# Patient Record
Sex: FEMALE | Race: WHITE | Employment: OTHER | URBAN - METROPOLITAN AREA
[De-identification: names, ages, dates, MRNs, and addresses within clinical notes are randomized per-mention and may not be internally consistent; named-entity substitution may affect disease eponyms.]

---

## 2023-05-23 ENCOUNTER — HOSPITAL ENCOUNTER (OUTPATIENT)
Dept: DATA CONVERSION | Facility: HOSPITAL | Age: 78
End: 2023-05-23
Attending: INTERNAL MEDICINE | Admitting: INTERNAL MEDICINE
Payer: MEDICARE

## 2023-05-23 DIAGNOSIS — J44.9 CHRONIC OBSTRUCTIVE PULMONARY DISEASE, UNSPECIFIED (MULTI): ICD-10-CM

## 2023-05-23 DIAGNOSIS — F17.200 NICOTINE DEPENDENCE, UNSPECIFIED, UNCOMPLICATED: ICD-10-CM

## 2023-05-23 DIAGNOSIS — I20.89 OTHER FORMS OF ANGINA PECTORIS (CMS-HCC): ICD-10-CM

## 2023-05-23 DIAGNOSIS — E78.5 HYPERLIPIDEMIA, UNSPECIFIED: ICD-10-CM

## 2023-05-23 DIAGNOSIS — I25.118 ATHEROSCLEROTIC HEART DISEASE OF NATIVE CORONARY ARTERY WITH OTHER FORMS OF ANGINA PECTORIS (CMS-HCC): ICD-10-CM

## 2023-05-23 DIAGNOSIS — I73.9 PERIPHERAL VASCULAR DISEASE, UNSPECIFIED (CMS-HCC): ICD-10-CM

## 2023-05-23 DIAGNOSIS — R94.30 ABNORMAL RESULT OF CARDIOVASCULAR FUNCTION STUDY, UNSPECIFIED: ICD-10-CM

## 2023-05-23 DIAGNOSIS — E11.9 TYPE 2 DIABETES MELLITUS WITHOUT COMPLICATIONS (MULTI): ICD-10-CM

## 2023-05-23 DIAGNOSIS — R07.9 CHEST PAIN, UNSPECIFIED: ICD-10-CM

## 2023-05-23 DIAGNOSIS — I25.82 CHRONIC TOTAL OCCLUSION OF CORONARY ARTERY: ICD-10-CM

## 2023-05-23 DIAGNOSIS — I10 ESSENTIAL (PRIMARY) HYPERTENSION: ICD-10-CM

## 2023-05-23 LAB
ANION GAP IN SER/PLAS: 8 MMOL/L (ref 10–20)
CALCIUM (MG/DL) IN SER/PLAS: 9.3 MG/DL (ref 8.6–10.3)
CARBON DIOXIDE, TOTAL (MMOL/L) IN SER/PLAS: 34 MMOL/L (ref 21–32)
CHLORIDE (MMOL/L) IN SER/PLAS: 95 MMOL/L (ref 98–107)
CREATININE (MG/DL) IN SER/PLAS: 0.79 MG/DL (ref 0.5–1.05)
ERYTHROCYTE DISTRIBUTION WIDTH (RATIO) BY AUTOMATED COUNT: 13.3 % (ref 11.5–14.5)
ERYTHROCYTE MEAN CORPUSCULAR HEMOGLOBIN CONCENTRATION (G/DL) BY AUTOMATED: 32.8 G/DL (ref 32–36)
ERYTHROCYTE MEAN CORPUSCULAR VOLUME (FL) BY AUTOMATED COUNT: 92 FL (ref 80–100)
ERYTHROCYTES (10*6/UL) IN BLOOD BY AUTOMATED COUNT: 4.67 X10E12/L (ref 4–5.2)
GFR FEMALE: 78 ML/MIN/1.73M2
GLUCOSE (MG/DL) IN SER/PLAS: 164 MG/DL (ref 74–99)
HEMATOCRIT (%) IN BLOOD BY AUTOMATED COUNT: 43 % (ref 36–46)
HEMOGLOBIN (G/DL) IN BLOOD: 14.1 G/DL (ref 12–16)
LEUKOCYTES (10*3/UL) IN BLOOD BY AUTOMATED COUNT: 5.7 X10E9/L (ref 4.4–11.3)
PLATELETS (10*3/UL) IN BLOOD AUTOMATED COUNT: 385 X10E9/L (ref 150–450)
POCT GLUCOSE: 157 MG/DL (ref 74–99)
POCT GLUCOSE: 159 MG/DL (ref 74–99)
POCT GLUCOSE: 183 MG/DL (ref 74–99)
POTASSIUM (MMOL/L) IN SER/PLAS: 3.6 MMOL/L (ref 3.5–5.3)
SODIUM (MMOL/L) IN SER/PLAS: 133 MMOL/L (ref 136–145)
UREA NITROGEN (MG/DL) IN SER/PLAS: 16 MG/DL (ref 6–23)

## 2023-07-10 LAB
ALANINE AMINOTRANSFERASE (SGPT) (U/L) IN SER/PLAS: 20 U/L (ref 7–45)
ASPARTATE AMINOTRANSFERASE (SGOT) (U/L) IN SER/PLAS: 23 U/L (ref 9–39)
CHOLESTEROL (MG/DL) IN SER/PLAS: 200 MG/DL (ref 0–199)
CHOLESTEROL IN HDL (MG/DL) IN SER/PLAS: 71.2 MG/DL
CHOLESTEROL/HDL RATIO: 2.8
LDL: 104 MG/DL (ref 0–99)
TRIGLYCERIDE (MG/DL) IN SER/PLAS: 125 MG/DL (ref 0–149)
VLDL: 25 MG/DL (ref 0–40)

## 2023-09-02 PROBLEM — I10 BENIGN ESSENTIAL HYPERTENSION: Status: ACTIVE | Noted: 2023-09-02

## 2023-09-02 PROBLEM — G47.00 INSOMNIA: Status: ACTIVE | Noted: 2023-09-02

## 2023-09-02 PROBLEM — M79.671 FOOT PAIN, BILATERAL: Status: ACTIVE | Noted: 2023-09-02

## 2023-09-02 PROBLEM — R82.81 PYURIA: Status: ACTIVE | Noted: 2023-09-02

## 2023-09-02 PROBLEM — M25.561 BILATERAL KNEE PAIN: Status: ACTIVE | Noted: 2023-09-02

## 2023-09-02 PROBLEM — R04.0 EPISTAXIS: Status: ACTIVE | Noted: 2023-09-02

## 2023-09-02 PROBLEM — K22.4 ESOPHAGEAL SPASM: Status: ACTIVE | Noted: 2023-09-02

## 2023-09-02 PROBLEM — M47.816 LUMBAR FACET ARTHROPATHY: Status: ACTIVE | Noted: 2023-09-02

## 2023-09-02 PROBLEM — R31.9 HEMATURIA: Status: ACTIVE | Noted: 2023-09-02

## 2023-09-02 PROBLEM — R26.2 DIFFICULTY WALKING: Status: ACTIVE | Noted: 2023-09-02

## 2023-09-02 PROBLEM — E78.5 DYSLIPIDEMIA: Status: ACTIVE | Noted: 2023-09-02

## 2023-09-02 PROBLEM — M62.81 MUSCLE WEAKNESS (GENERALIZED): Status: ACTIVE | Noted: 2023-09-02

## 2023-09-02 PROBLEM — M25.562 BILATERAL KNEE PAIN: Status: ACTIVE | Noted: 2023-09-02

## 2023-09-02 PROBLEM — M25.671 STIFFNESS OF RIGHT ANKLE JOINT: Status: ACTIVE | Noted: 2023-09-02

## 2023-09-02 PROBLEM — R79.89 ELEVATED LFTS: Status: ACTIVE | Noted: 2023-09-02

## 2023-09-02 PROBLEM — H01.019 BLEPHARITIS, ULCERATIVE: Status: ACTIVE | Noted: 2023-09-02

## 2023-09-02 PROBLEM — L84 CALLUS: Status: ACTIVE | Noted: 2023-09-02

## 2023-09-02 PROBLEM — M51.26 PROTRUSION OF LUMBAR INTERVERTEBRAL DISC: Status: ACTIVE | Noted: 2023-09-02

## 2023-09-02 PROBLEM — I10 HYPERTENSION: Status: ACTIVE | Noted: 2023-09-02

## 2023-09-02 PROBLEM — R10.9 ABDOMINAL PAIN: Status: ACTIVE | Noted: 2023-09-02

## 2023-09-02 PROBLEM — E11.3293: Status: ACTIVE | Noted: 2023-09-02

## 2023-09-02 PROBLEM — R20.2 PARESTHESIA OF BOTH HANDS: Status: ACTIVE | Noted: 2023-09-02

## 2023-09-02 PROBLEM — G47.33 OBSTRUCTIVE SLEEP APNEA SYNDROME: Status: ACTIVE | Noted: 2023-09-02

## 2023-09-02 PROBLEM — J38.00 VOCAL CORD PARALYSIS: Status: ACTIVE | Noted: 2023-09-02

## 2023-09-02 PROBLEM — K14.6 BURNING MOUTH SYNDROME: Status: ACTIVE | Noted: 2023-09-02

## 2023-09-02 PROBLEM — H61.23 BILATERAL IMPACTED CERUMEN: Status: ACTIVE | Noted: 2023-09-02

## 2023-09-02 PROBLEM — L84 PRE-ULCERATIVE CORN OR CALLOUS: Status: ACTIVE | Noted: 2023-09-02

## 2023-09-02 PROBLEM — E11.65 TYPE 2 DIABETES MELLITUS WITH HYPERGLYCEMIA (MULTI): Status: ACTIVE | Noted: 2023-09-02

## 2023-09-02 PROBLEM — S83.511A RUPTURE OF ANTERIOR CRUCIATE LIGAMENT OF RIGHT KNEE: Status: ACTIVE | Noted: 2023-09-02

## 2023-09-02 PROBLEM — Z96.1 PSEUDOPHAKIA, BOTH EYES: Status: ACTIVE | Noted: 2023-09-02

## 2023-09-02 PROBLEM — J34.2 DEVIATED NASAL SEPTUM: Status: ACTIVE | Noted: 2023-09-02

## 2023-09-02 PROBLEM — R10.31 RIGHT LOWER QUADRANT ABDOMINAL PAIN: Status: ACTIVE | Noted: 2023-09-02

## 2023-09-02 PROBLEM — H40.053 BILATERAL OCULAR HYPERTENSION: Status: ACTIVE | Noted: 2023-09-02

## 2023-09-02 PROBLEM — J31.0 CHRONIC RHINITIS: Status: ACTIVE | Noted: 2023-09-02

## 2023-09-02 PROBLEM — R10.11 RUQ DISCOMFORT: Status: ACTIVE | Noted: 2023-09-02

## 2023-09-02 PROBLEM — M79.7 FIBROMYALGIA: Status: ACTIVE | Noted: 2023-09-02

## 2023-09-02 PROBLEM — H26.492 PCO (POSTERIOR CAPSULAR OPACIFICATION), LEFT: Status: ACTIVE | Noted: 2023-09-02

## 2023-09-02 PROBLEM — M79.672 FOOT PAIN, BILATERAL: Status: ACTIVE | Noted: 2023-09-02

## 2023-09-02 PROBLEM — E78.00 ELEVATED LDL CHOLESTEROL LEVEL: Status: ACTIVE | Noted: 2023-09-02

## 2023-09-02 PROBLEM — H02.403 ACQUIRED INVOLUTIONAL PTOSIS OF BOTH EYELIDS: Status: ACTIVE | Noted: 2023-09-02

## 2023-09-02 PROBLEM — M25.561 RIGHT KNEE PAIN: Status: ACTIVE | Noted: 2023-09-02

## 2023-09-02 PROBLEM — K13.79 MOUTH SORES: Status: ACTIVE | Noted: 2023-09-02

## 2023-09-02 PROBLEM — R10.13 EPIGASTRIC PAIN: Status: ACTIVE | Noted: 2023-09-02

## 2023-09-02 PROBLEM — M47.816 LUMBAR SPONDYLOSIS: Status: ACTIVE | Noted: 2023-09-02

## 2023-09-02 PROBLEM — M54.50 LOW BACK PAIN: Status: ACTIVE | Noted: 2023-09-02

## 2023-09-02 PROBLEM — R31.21 ASYMPTOMATIC MICROSCOPIC HEMATURIA: Status: ACTIVE | Noted: 2023-09-02

## 2023-09-02 PROBLEM — K21.00 GERD WITH ESOPHAGITIS: Status: ACTIVE | Noted: 2023-09-02

## 2023-09-02 PROBLEM — M48.061 LUMBAR SPINAL STENOSIS: Status: ACTIVE | Noted: 2023-09-02

## 2023-09-02 PROBLEM — J44.9 CHRONIC OBSTRUCTIVE PULMONARY DISEASE (MULTI): Status: ACTIVE | Noted: 2023-09-02

## 2023-09-02 PROBLEM — J34.3 HYPERTROPHY OF BOTH INFERIOR NASAL TURBINATES: Status: ACTIVE | Noted: 2023-09-02

## 2023-09-02 PROBLEM — J34.89 RHINORRHEA: Status: ACTIVE | Noted: 2023-09-02

## 2023-09-02 PROBLEM — H40.003 GLAUCOMA SUSPECT, BOTH EYES: Status: ACTIVE | Noted: 2023-09-02

## 2023-09-02 RX ORDER — AMLODIPINE BESYLATE 5 MG/1
5 TABLET ORAL DAILY
COMMUNITY
End: 2023-11-27 | Stop reason: SDUPTHER

## 2023-09-02 RX ORDER — INSULIN GLARGINE 100 [IU]/ML
27 INJECTION, SOLUTION SUBCUTANEOUS EVERY MORNING
Status: ON HOLD | COMMUNITY
Start: 2021-03-16 | End: 2024-02-17 | Stop reason: WASHOUT

## 2023-09-02 RX ORDER — FLUTICASONE PROPIONATE 50 MCG
1 SPRAY, SUSPENSION (ML) NASAL 2 TIMES DAILY
COMMUNITY
Start: 2018-08-06 | End: 2023-11-27 | Stop reason: ALTCHOICE

## 2023-09-02 RX ORDER — PEN NEEDLE, DIABETIC 32GX 5/32"
NEEDLE, DISPOSABLE MISCELLANEOUS
COMMUNITY
Start: 2022-10-28

## 2023-09-02 RX ORDER — BUDESONIDE, GLYCOPYRROLATE, AND FORMOTEROL FUMARATE 160; 9; 4.8 UG/1; UG/1; UG/1
2 AEROSOL, METERED RESPIRATORY (INHALATION)
COMMUNITY
Start: 2023-05-02 | End: 2024-02-17 | Stop reason: HOSPADM

## 2023-09-02 RX ORDER — GABAPENTIN 100 MG/1
100 CAPSULE ORAL 3 TIMES DAILY
COMMUNITY
End: 2023-11-27 | Stop reason: ALTCHOICE

## 2023-09-02 RX ORDER — METOPROLOL SUCCINATE 50 MG/1
25 TABLET, EXTENDED RELEASE ORAL DAILY
COMMUNITY
Start: 2023-03-03 | End: 2023-11-27 | Stop reason: SDUPTHER

## 2023-09-02 RX ORDER — INSULIN DEGLUDEC 100 U/ML
30 INJECTION, SOLUTION SUBCUTANEOUS
COMMUNITY
Start: 2022-10-28 | End: 2024-01-31 | Stop reason: SDUPTHER

## 2023-09-02 RX ORDER — OMEPRAZOLE 20 MG/1
CAPSULE, DELAYED RELEASE ORAL
COMMUNITY
End: 2023-11-27 | Stop reason: ALTCHOICE

## 2023-09-02 RX ORDER — GABAPENTIN 300 MG/1
300 CAPSULE ORAL DAILY
COMMUNITY
End: 2024-02-17 | Stop reason: HOSPADM

## 2023-09-02 RX ORDER — PREDNISOLONE 15 MG/5ML
10 SOLUTION ORAL 3 TIMES DAILY
COMMUNITY
Start: 2021-09-23 | End: 2023-11-27 | Stop reason: ALTCHOICE

## 2023-09-02 RX ORDER — ATORVASTATIN CALCIUM 80 MG/1
80 TABLET, FILM COATED ORAL DAILY
COMMUNITY
End: 2023-11-27 | Stop reason: ALTCHOICE

## 2023-09-02 RX ORDER — DIPHENHYDRAMINE HCL 12.5MG/5ML
10 ELIXIR ORAL DAILY
COMMUNITY
End: 2023-11-27 | Stop reason: ALTCHOICE

## 2023-09-02 RX ORDER — AZELASTINE 1 MG/ML
2 SPRAY, METERED NASAL 2 TIMES DAILY
COMMUNITY
Start: 2018-08-06 | End: 2023-11-27 | Stop reason: ALTCHOICE

## 2023-09-02 RX ORDER — NYSTATIN 100000 [USP'U]/ML
10 SUSPENSION ORAL DAILY
Status: ON HOLD | COMMUNITY
Start: 2021-09-23 | End: 2024-02-17 | Stop reason: WASHOUT

## 2023-09-02 RX ORDER — MELOXICAM 15 MG/1
15 TABLET ORAL DAILY
COMMUNITY
End: 2023-11-27 | Stop reason: ALTCHOICE

## 2023-09-02 RX ORDER — INSULIN LISPRO 100 [IU]/ML
60 INJECTION, SOLUTION INTRAVENOUS; SUBCUTANEOUS DAILY
Status: ON HOLD | COMMUNITY
Start: 2018-12-04 | End: 2024-02-17 | Stop reason: WASHOUT

## 2023-09-02 RX ORDER — ALPRAZOLAM 0.25 MG/1
0.25 TABLET ORAL 2 TIMES DAILY
Status: ON HOLD | COMMUNITY
End: 2024-02-17 | Stop reason: WASHOUT

## 2023-09-02 RX ORDER — ASPIRIN 81 MG/1
81 TABLET ORAL DAILY
COMMUNITY
End: 2023-11-27 | Stop reason: ALTCHOICE

## 2023-09-02 RX ORDER — PIOGLITAZONEHYDROCHLORIDE 30 MG/1
30 TABLET ORAL DAILY
COMMUNITY
End: 2023-11-27 | Stop reason: ALTCHOICE

## 2023-09-02 RX ORDER — OMEPRAZOLE 40 MG/1
CAPSULE, DELAYED RELEASE ORAL DAILY
COMMUNITY
End: 2023-11-27 | Stop reason: ALTCHOICE

## 2023-09-02 RX ORDER — ISOSORBIDE MONONITRATE 60 MG/1
60 TABLET, EXTENDED RELEASE ORAL DAILY
COMMUNITY
End: 2023-11-27 | Stop reason: ALTCHOICE

## 2023-09-02 RX ORDER — GABAPENTIN 400 MG/1
CAPSULE ORAL
COMMUNITY
Start: 2021-03-16 | End: 2023-11-27 | Stop reason: ALTCHOICE

## 2023-09-02 RX ORDER — CLOBETASOL PROPIONATE 0.05 MG/G
GEL TOPICAL 2 TIMES DAILY
COMMUNITY
Start: 2021-11-17 | End: 2023-11-27 | Stop reason: ALTCHOICE

## 2023-09-02 RX ORDER — ASPIRIN 325 MG
TABLET, DELAYED RELEASE (ENTERIC COATED) ORAL
COMMUNITY
End: 2023-11-27 | Stop reason: ALTCHOICE

## 2023-09-02 RX ORDER — AZELASTINE HCL 205.5 UG/1
1 SPRAY NASAL 2 TIMES DAILY
COMMUNITY
End: 2023-11-27 | Stop reason: ALTCHOICE

## 2023-09-02 RX ORDER — CIPROFLOXACIN 500 MG/1
TABLET ORAL
COMMUNITY
Start: 2022-01-11 | End: 2023-11-27 | Stop reason: ALTCHOICE

## 2023-09-02 RX ORDER — LATANOPROST 50 UG/ML
1 SOLUTION/ DROPS OPHTHALMIC DAILY
COMMUNITY
Start: 2020-07-31 | End: 2024-01-04 | Stop reason: SDUPTHER

## 2023-09-02 RX ORDER — LOSARTAN POTASSIUM AND HYDROCHLOROTHIAZIDE 25; 100 MG/1; MG/1
1 TABLET ORAL DAILY
COMMUNITY
Start: 2018-08-31 | End: 2023-11-27 | Stop reason: ALTCHOICE

## 2023-09-02 RX ORDER — ATORVASTATIN CALCIUM 40 MG/1
40 TABLET, FILM COATED ORAL DAILY
COMMUNITY
End: 2024-02-17 | Stop reason: HOSPADM

## 2023-09-02 RX ORDER — VALSARTAN AND HYDROCHLOROTHIAZIDE 160; 25 MG/1; MG/1
1 TABLET ORAL DAILY
COMMUNITY
End: 2023-11-27 | Stop reason: ALTCHOICE

## 2023-09-02 RX ORDER — METFORMIN HYDROCHLORIDE 500 MG/1
500 TABLET ORAL
COMMUNITY
Start: 2021-09-03 | End: 2024-02-17 | Stop reason: HOSPADM

## 2023-09-02 RX ORDER — CLOTRIMAZOLE 10 MG/1
10 LOZENGE ORAL; TOPICAL 2 TIMES DAILY
COMMUNITY
Start: 2021-10-20 | End: 2023-11-27 | Stop reason: ALTCHOICE

## 2023-09-07 VITALS — BODY MASS INDEX: 24.77 KG/M2 | HEIGHT: 63 IN | WEIGHT: 139.77 LBS

## 2023-09-11 LAB
ALANINE AMINOTRANSFERASE (SGPT) (U/L) IN SER/PLAS: 21 U/L (ref 7–45)
ASPARTATE AMINOTRANSFERASE (SGOT) (U/L) IN SER/PLAS: 22 U/L (ref 9–39)
CHOLESTEROL (MG/DL) IN SER/PLAS: 136 MG/DL (ref 0–199)
CHOLESTEROL IN HDL (MG/DL) IN SER/PLAS: 55 MG/DL
CHOLESTEROL/HDL RATIO: 2.5
LDL: 60 MG/DL (ref 0–99)
TRIGLYCERIDE (MG/DL) IN SER/PLAS: 104 MG/DL (ref 0–149)
VLDL: 21 MG/DL (ref 0–40)

## 2023-09-30 NOTE — H&P
History & Physical Reviewed:   I have reviewed the History and Physical dated:  15-May-2023   History and Physical reviewed and relevant findings noted. Patient examined to review pertinent physical  findings.: No significant changes   Home Medications Reviewed: no changes noted   Allergies Reviewed: no changes noted       Airway/Sedation Assessment:  ·  Emotional Status calm   ·  Neurologic alert & oriented x 3   ·  Respiratory clear to auscultation   ·  Cardiovascular rhythm & rate regular   ·  GI/ soft, nontender     · Pulses present: Pedal Left, Pedal Right, Radial Left, Radial Right     ·  Mouth Opening OK yes   ·  Neck Flexibility OK yes   ·  Loose Teeth no   ·  Oropharyngeal Classification Class III   ·  ASA PS Classification ASA III   ·  Sedation Plan moderate sedation       ERAS (Enhanced Recovery After Surgery):  ·  ERAS Patient: no     Consent:   COVID-19 Consent:  ·  COVID-19 Risk Consent Surgeon has reviewed key risks related to the risk of nba COVID-19 and if they contract COVID-19 what the risks are.     Assessment/Plan:   ·  Assessment and Plan    77 Year old female with Cp presents for Left heart Cath with Dr Lai.      Electronic Signatures:  Ese Torres (PAC)  (Signed 23-May-2023 08:17)   Authored: History & Physical Reviewed, Airway/Sedation,  ERAS, Consent, Assessment/Plan, Note Completion      Last Updated: 23-May-2023 08:17 by Ese Torres (PAC)

## 2023-10-04 ENCOUNTER — PHARMACY VISIT (OUTPATIENT)
Dept: PHARMACY | Facility: CLINIC | Age: 78
End: 2023-10-04
Payer: MEDICARE

## 2023-10-04 PROCEDURE — RXMED WILLOW AMBULATORY MEDICATION CHARGE

## 2023-10-05 ENCOUNTER — PHARMACY VISIT (OUTPATIENT)
Dept: PHARMACY | Facility: CLINIC | Age: 78
End: 2023-10-05
Payer: MEDICARE

## 2023-11-27 ENCOUNTER — OFFICE VISIT (OUTPATIENT)
Dept: CARDIOLOGY | Facility: CLINIC | Age: 78
End: 2023-11-27
Payer: MEDICARE

## 2023-11-27 VITALS
DIASTOLIC BLOOD PRESSURE: 88 MMHG | OXYGEN SATURATION: 98 % | HEART RATE: 93 BPM | BODY MASS INDEX: 24.15 KG/M2 | WEIGHT: 140.7 LBS | SYSTOLIC BLOOD PRESSURE: 156 MMHG

## 2023-11-27 DIAGNOSIS — E78.5 DYSLIPIDEMIA: ICD-10-CM

## 2023-11-27 DIAGNOSIS — I63.9 CEREBROVASCULAR ACCIDENT (CVA), UNSPECIFIED MECHANISM (MULTI): ICD-10-CM

## 2023-11-27 DIAGNOSIS — M19.90 ARTHRITIS: ICD-10-CM

## 2023-11-27 DIAGNOSIS — I25.10 CORONARY ARTERY DISEASE INVOLVING NATIVE CORONARY ARTERY OF NATIVE HEART WITHOUT ANGINA PECTORIS: ICD-10-CM

## 2023-11-27 DIAGNOSIS — I10 BENIGN ESSENTIAL HYPERTENSION: Primary | ICD-10-CM

## 2023-11-27 PROCEDURE — 99215 OFFICE O/P EST HI 40 MIN: CPT | Performed by: NURSE PRACTITIONER

## 2023-11-27 PROCEDURE — 93010 ELECTROCARDIOGRAM REPORT: CPT | Performed by: INTERNAL MEDICINE

## 2023-11-27 PROCEDURE — 1159F MED LIST DOCD IN RCRD: CPT | Performed by: NURSE PRACTITIONER

## 2023-11-27 PROCEDURE — 1126F AMNT PAIN NOTED NONE PRSNT: CPT | Performed by: NURSE PRACTITIONER

## 2023-11-27 PROCEDURE — 3079F DIAST BP 80-89 MM HG: CPT | Performed by: NURSE PRACTITIONER

## 2023-11-27 PROCEDURE — 93005 ELECTROCARDIOGRAM TRACING: CPT | Performed by: NURSE PRACTITIONER

## 2023-11-27 PROCEDURE — 3077F SYST BP >= 140 MM HG: CPT | Performed by: NURSE PRACTITIONER

## 2023-11-27 RX ORDER — MELOXICAM 15 MG/1
15 TABLET ORAL 3 TIMES WEEKLY
Start: 2023-11-27 | End: 2024-02-17 | Stop reason: HOSPADM

## 2023-11-27 RX ORDER — HYDRALAZINE HYDROCHLORIDE 25 MG/1
25 TABLET, FILM COATED ORAL 2 TIMES DAILY
Qty: 60 TABLET | Refills: 11
Start: 2023-11-27 | End: 2024-01-15 | Stop reason: ALTCHOICE

## 2023-11-27 RX ORDER — METOPROLOL SUCCINATE 25 MG/1
25 TABLET, EXTENDED RELEASE ORAL DAILY
Qty: 90 TABLET | Refills: 3 | Status: SHIPPED | OUTPATIENT
Start: 2023-11-27 | End: 2024-02-17 | Stop reason: HOSPADM

## 2023-11-27 RX ORDER — AMLODIPINE BESYLATE 10 MG/1
10 TABLET ORAL DAILY
Qty: 90 TABLET | Refills: 3 | Status: SHIPPED | OUTPATIENT
Start: 2023-11-27 | End: 2024-02-17 | Stop reason: HOSPADM

## 2023-11-27 RX ORDER — EZETIMIBE 10 MG/1
10 TABLET ORAL DAILY
COMMUNITY
End: 2024-02-17 | Stop reason: HOSPADM

## 2023-11-27 RX ORDER — LOSARTAN POTASSIUM 100 MG/1
100 TABLET ORAL DAILY
Qty: 30 TABLET | Refills: 11
Start: 2023-11-27 | End: 2024-02-17 | Stop reason: HOSPADM

## 2023-11-27 ASSESSMENT — LIFESTYLE VARIABLES
HOW OFTEN DO YOU HAVE A DRINK CONTAINING ALCOHOL: NEVER
SKIP TO QUESTIONS 9-10: 1
HOW MANY STANDARD DRINKS CONTAINING ALCOHOL DO YOU HAVE ON A TYPICAL DAY: PATIENT DOES NOT DRINK
HOW OFTEN DO YOU HAVE SIX OR MORE DRINKS ON ONE OCCASION: NEVER
AUDIT-C TOTAL SCORE: 0

## 2023-11-27 ASSESSMENT — PAIN SCALES - GENERAL: PAINLEVEL: 0-NO PAIN

## 2023-11-27 NOTE — PROGRESS NOTES
Subjective   Malou Junior is a 78 y.o. female.    Chief Complaint:  Hypertension, Hyperlipidemia, and Hospital Follow-up    Mrs. Junior returns for a follow up. She is seen in collaboration with Dr. Garcia. She was hospitalized 9/2023 for right sided weakness. MRI of brain showed acute infarct. Her carotid ultrasounds showed <50% stenosis bilaterally and her echocardiogram showed an EF of 60-65% with no significant valvular disease. Post discharge she wore a holter monitor for 2 weeks which showed no evidence of atrial fibrillation, though she was started on eliqius while in the hospital. It is unclear if there is any documentation of a-fib or if it was presumed. A few of her BP medications were changed which she seems to be tolerating. She continues to work with PT/OT and feels her strength is improving. She offers no other complaints or concerns today. She denies any complaints of chest pain, shortness of breath, lightheadedness, dizziness, palpitations, syncope, orthopnea, paroxysmal nocturnal dyspnea, lower extremity swelling or bleeding concerns.          Review of Systems   All other systems reviewed and are negative.      Objective   Physical Exam  Constitutional:       Appearance: Healthy appearance. In no distress  Pulmonary:      Effort: Pulmonary effort is normal.      Breath sounds: Normal breath sounds.   Cardiovascular:      Normal rate. Regular rhythm. Normal S1. Normal S2.       Murmurs: There is no murmur.   Edema:     Peripheral edema absent.   Abdominal:      Palpations: Abdomen is soft.   Musculoskeletal: Normal range of motion.      Cervical back: Normal range of motion. Skin:     General: Skin is warm and dry.   Neurological:      Mental Status: Alert and oriented to person, place and time.     EKG obtained and reviewed. Normal sinus rhythm. Anterior infarct, age undetermined. HR 88    Lab Review:   Lab Results   Component Value Date     09/18/2023    K 3.9 09/18/2023    CL 95 (L)  09/18/2023    CO2 27 09/18/2023    BUN 14 09/18/2023    CREATININE 0.8 09/18/2023    GLUCOSE 159 (H) 09/18/2023    CALCIUM 9.4 09/18/2023     Lab Results   Component Value Date    WBC 8.2 09/17/2023    HGB 15.1 (H) 09/17/2023    HCT 44.7 (H) 09/17/2023    MCV 87.6 09/17/2023     09/17/2023     Lab Results   Component Value Date    CHOL 121 (L) 09/18/2023    TRIG 90 09/18/2023    HDL 56 09/18/2023       Assessment/Plan   Mrs. Junior is a pleasant 78 year old  female with a past medical history significant for hypertension, hyperlipidemia, COPD, nicotine dependence, T2DM, severe back arthritis and CAD. Heart catheterization 5/2023 following a positive NM stress test showed some moderate to severe LCx disease and a totally occluded though well collateralized RCA with normal LV systolic function. Echocardiogram 9/2023 showed an EF of 60-65% with no significant valvular disease. She presents today following a hospitalization for CVA. Her VS and EKG remain stable. Post discharge she wore a holter monitor for 2 weeks which showed no evidence of atrial fibrillation, though she was started on eliqius while in the hospital. It is unclear if there is any documentation of a-fib or if it was presumed. I will have her continue all medications unchanged. I am glad to see her weakness is improving and that she didn't have too many other deficits. She will follow up with us in clinic in 6 weeks. She knows to call for any concerns.

## 2023-11-28 LAB
ATRIAL RATE: 88 BPM
P AXIS: -23 DEGREES
P OFFSET: 183 MS
P ONSET: 130 MS
PR INTERVAL: 168 MS
Q ONSET: 214 MS
QRS COUNT: 14 BEATS
QRS DURATION: 80 MS
QT INTERVAL: 374 MS
QTC CALCULATION(BAZETT): 452 MS
QTC FREDERICIA: 425 MS
R AXIS: 24 DEGREES
T AXIS: 46 DEGREES
T OFFSET: 401 MS
VENTRICULAR RATE: 88 BPM

## 2023-12-07 ENCOUNTER — APPOINTMENT (OUTPATIENT)
Dept: ENDOCRINOLOGY | Facility: CLINIC | Age: 78
End: 2023-12-07
Payer: MEDICARE

## 2024-01-04 ENCOUNTER — OFFICE VISIT (OUTPATIENT)
Dept: OPHTHALMOLOGY | Facility: CLINIC | Age: 79
End: 2024-01-04
Payer: MEDICARE

## 2024-01-04 DIAGNOSIS — H40.1131 PRIMARY OPEN ANGLE GLAUCOMA (POAG) OF BOTH EYES, MILD STAGE: Primary | ICD-10-CM

## 2024-01-04 PROCEDURE — 99214 OFFICE O/P EST MOD 30 MIN: CPT | Performed by: OPHTHALMOLOGY

## 2024-01-04 PROCEDURE — 92133 CPTRZD OPH DX IMG PST SGM ON: CPT | Performed by: OPHTHALMOLOGY

## 2024-01-04 RX ORDER — LATANOPROST 50 UG/ML
1 SOLUTION/ DROPS OPHTHALMIC DAILY
Qty: 7.5 ML | Refills: 3 | Status: SHIPPED | OUTPATIENT
Start: 2024-01-04 | End: 2024-02-17 | Stop reason: HOSPADM

## 2024-01-04 ASSESSMENT — CONF VISUAL FIELD
OS_SUPERIOR_TEMPORAL_RESTRICTION: 0
OS_INFERIOR_NASAL_RESTRICTION: 0
OS_SUPERIOR_NASAL_RESTRICTION: 0
OD_INFERIOR_NASAL_RESTRICTION: 0
METHOD: COUNTING FINGERS
OD_NORMAL: 1
OD_SUPERIOR_TEMPORAL_RESTRICTION: 0
OS_NORMAL: 1
OD_SUPERIOR_NASAL_RESTRICTION: 0
OD_INFERIOR_TEMPORAL_RESTRICTION: 0
OS_INFERIOR_TEMPORAL_RESTRICTION: 0

## 2024-01-04 ASSESSMENT — PACHYMETRY
OS_CT(UM): 550
OD_CT(UM): 555

## 2024-01-04 ASSESSMENT — EXTERNAL EXAM - LEFT EYE: OS_EXAM: NORMAL

## 2024-01-04 ASSESSMENT — VISUAL ACUITY
OD_SC+: -2
METHOD: SNELLEN - LINEAR
OS_SC: 20/40
OD_SC: 20/30

## 2024-01-04 ASSESSMENT — SLIT LAMP EXAM - LIDS
COMMENTS: GOOD POSITION
COMMENTS: GOOD POSITION

## 2024-01-04 ASSESSMENT — TONOMETRY
OD_IOP_MMHG: 18
IOP_METHOD: GOLDMANN APPLANATION
OS_IOP_MMHG: 20

## 2024-01-04 ASSESSMENT — CUP TO DISC RATIO
OS_RATIO: 0.2
OD_RATIO: 0.2

## 2024-01-04 ASSESSMENT — EXTERNAL EXAM - RIGHT EYE: OD_EXAM: NORMAL

## 2024-01-04 NOTE — PROGRESS NOTES
1. Ocular HTN    pachys normal.    Tmax 24/26  OCT, Optic Nerve - OU - Both Eyes          Right Eye  Images reviewed and comparison made to baseline.     Left Eye  Images reviewed and comparison made to baseline.     Notes  Robust OU, no evidence of glaucomatous change           HVF 24-2 7/13/23: nsd os>od, no evidence of glaucomatous damage  IOP remains at goal <21 OU   continue latan qhs OU   rtc 6 months for HVF 24-2 and IOP check      2. Involutional Ptosis - seen by Gallo, decision to hold off on surgery due to concnern for worsening.eye dryness, pt not bothered by ptosis      3. Diabetes without retinopathy, both eyes   - On insulin/metformin      -continue medical management     Of note patient had stroke in 9/23 with right sided weakness, in neuro-rehab now doing well

## 2024-01-15 ENCOUNTER — OFFICE VISIT (OUTPATIENT)
Dept: CARDIOLOGY | Facility: CLINIC | Age: 79
End: 2024-01-15
Payer: MEDICARE

## 2024-01-15 VITALS
HEIGHT: 64 IN | DIASTOLIC BLOOD PRESSURE: 70 MMHG | WEIGHT: 141.6 LBS | OXYGEN SATURATION: 96 % | HEART RATE: 92 BPM | BODY MASS INDEX: 24.17 KG/M2 | SYSTOLIC BLOOD PRESSURE: 149 MMHG

## 2024-01-15 DIAGNOSIS — I10 BENIGN ESSENTIAL HYPERTENSION: Primary | ICD-10-CM

## 2024-01-15 DIAGNOSIS — E78.5 DYSLIPIDEMIA: ICD-10-CM

## 2024-01-15 DIAGNOSIS — I25.10 CORONARY ARTERY DISEASE INVOLVING NATIVE CORONARY ARTERY OF NATIVE HEART WITHOUT ANGINA PECTORIS: ICD-10-CM

## 2024-01-15 PROCEDURE — 1036F TOBACCO NON-USER: CPT | Performed by: NURSE PRACTITIONER

## 2024-01-15 PROCEDURE — 99214 OFFICE O/P EST MOD 30 MIN: CPT | Performed by: NURSE PRACTITIONER

## 2024-01-15 PROCEDURE — 3078F DIAST BP <80 MM HG: CPT | Performed by: NURSE PRACTITIONER

## 2024-01-15 PROCEDURE — 1159F MED LIST DOCD IN RCRD: CPT | Performed by: NURSE PRACTITIONER

## 2024-01-15 PROCEDURE — 1126F AMNT PAIN NOTED NONE PRSNT: CPT | Performed by: NURSE PRACTITIONER

## 2024-01-15 PROCEDURE — 3077F SYST BP >= 140 MM HG: CPT | Performed by: NURSE PRACTITIONER

## 2024-01-15 RX ORDER — ISOSORBIDE MONONITRATE 30 MG/1
30 TABLET, EXTENDED RELEASE ORAL DAILY
Qty: 30 TABLET | Refills: 11 | Status: SHIPPED | OUTPATIENT
Start: 2024-01-15 | End: 2024-02-17 | Stop reason: HOSPADM

## 2024-01-15 RX ORDER — OMEPRAZOLE 40 MG/1
40 CAPSULE, DELAYED RELEASE ORAL
COMMUNITY
End: 2024-02-17 | Stop reason: HOSPADM

## 2024-01-15 RX ORDER — ALBUTEROL SULFATE 5 MG/ML
2.5 SOLUTION RESPIRATORY (INHALATION) EVERY 6 HOURS PRN
COMMUNITY
End: 2024-02-17 | Stop reason: HOSPADM

## 2024-01-15 ASSESSMENT — ENCOUNTER SYMPTOMS
DEPRESSION: 0
OCCASIONAL FEELINGS OF UNSTEADINESS: 1
LOSS OF SENSATION IN FEET: 0
HYPERTENSION: 1

## 2024-01-15 ASSESSMENT — PAIN SCALES - GENERAL: PAINLEVEL: 0-NO PAIN

## 2024-01-15 NOTE — PATIENT INSTRUCTIONS
Stop hydralazine     Start isosorbide 30 mg daily    Obtain fasting labs    Follow up in 6 weeks     Call for any concerns

## 2024-01-15 NOTE — PROGRESS NOTES
Subjective   Malou Junior is a 78 y.o. female.    Chief Complaint:  Follow-up, Hypertension, Hyperlipidemia, and Coronary Artery Disease    Mrs. Junior returns for a 6 week follow up. She is seen in collaboration with Dr. Garcia. She has been feeling well from a cardiac standpoint. She has remained compliant with her medications, though feels hydralazine may be contributing to increased urination. She continues to work closely with PT and seems to be getting stronger. She offers no other cardiovascular complaints or concerns today. She denies any complaints of chest pain, shortness of breath, lightheadedness, dizziness, palpitations, syncope, orthopnea, paroxysmal nocturnal dyspnea, lower extremity swelling or bleeding concerns.      Hypertension    Hyperlipidemia    Coronary Artery Disease  Risk factors include hyperlipidemia.       Review of Systems   All other systems reviewed and are negative.      Objective   Physical Exam  Constitutional:       Appearance: Healthy appearance. In no distress  Pulmonary:      Effort: Pulmonary effort is normal.      Breath sounds: Normal breath sounds.   Cardiovascular:      Normal rate. Regular rhythm. Normal S1. Normal S2.       Murmurs: There is no murmur.      Carotids: right carotid pulse +2, no bruit heard over the right carotid. left carotid pulse +2, no bruit heard over the left carotid.  Edema:     Peripheral edema absent.   Abdominal:      Palpations: Abdomen is soft.   Musculoskeletal:       Cervical back: Normal range of motion.   Skin:     General: Skin is warm and dry. Normal color and pigmentation   Neurological:      Mental Status: Alert and oriented to person, place and time.   Psychiatric:     Mood and Affect: appropriate mood and appropriate affect.       Lab Review:   Lab Results   Component Value Date     09/18/2023    K 3.9 09/18/2023    CL 95 (L) 09/18/2023    CO2 27 09/18/2023    BUN 14 09/18/2023    CREATININE 0.8 09/18/2023    GLUCOSE 159 (H)  09/18/2023    CALCIUM 9.4 09/18/2023     Lab Results   Component Value Date    WBC 8.2 09/17/2023    HGB 15.1 (H) 09/17/2023    HCT 44.7 (H) 09/17/2023    MCV 87.6 09/17/2023     09/17/2023     Lab Results   Component Value Date    CHOL 121 (L) 09/18/2023    TRIG 90 09/18/2023    HDL 56 09/18/2023       Assessment/Plan   Mrs. Junior is a pleasant 78 year old  female with a past medical history significant for hypertension, hyperlipidemia, COPD, nicotine dependence, T2DM, CVA (10/2023- presumed a-fib on eliquis anticoagulation), severe back arthritis and CAD. Heart catheterization 5/2023 following a positive NM stress test showed moderate to severe LCx disease and a totally occluded though well collateralized RCA with normal LV systolic function. Echocardiogram 9/2023 showed an EF of 60-65% with no significant valvular disease. She presents today for routine follow up stable from a cardiac standpoint. Her VS and EKG remain stable. She seems to be getting around reasonably well, denying any exertional intolerances. Her biggest complaint is increased urination, which she feels is related to hydralazine. I will have her stop hydralazine and start isosorbide 30 mg daily. She will follow up with us in clinic in 6 weeks for a BP check. She will continue all other medications unchanged. She knows to call for any concerns.

## 2024-01-25 ENCOUNTER — TELEPHONE (OUTPATIENT)
Dept: CARDIOLOGY | Facility: HOSPITAL | Age: 79
End: 2024-01-25
Payer: MEDICARE

## 2024-01-31 ENCOUNTER — OFFICE VISIT (OUTPATIENT)
Dept: ENDOCRINOLOGY | Facility: CLINIC | Age: 79
End: 2024-01-31
Payer: MEDICARE

## 2024-01-31 VITALS
DIASTOLIC BLOOD PRESSURE: 68 MMHG | HEART RATE: 99 BPM | WEIGHT: 141 LBS | SYSTOLIC BLOOD PRESSURE: 136 MMHG | BODY MASS INDEX: 24.2 KG/M2

## 2024-01-31 DIAGNOSIS — E78.5 DYSLIPIDEMIA: ICD-10-CM

## 2024-01-31 DIAGNOSIS — E11.65 TYPE 2 DIABETES MELLITUS WITH HYPERGLYCEMIA, WITH LONG-TERM CURRENT USE OF INSULIN (MULTI): Primary | ICD-10-CM

## 2024-01-31 DIAGNOSIS — Z79.4 TYPE 2 DIABETES MELLITUS WITH HYPERGLYCEMIA, WITH LONG-TERM CURRENT USE OF INSULIN (MULTI): Primary | ICD-10-CM

## 2024-01-31 DIAGNOSIS — I10 BENIGN ESSENTIAL HYPERTENSION: ICD-10-CM

## 2024-01-31 LAB — POC HEMOGLOBIN A1C: 6.8 % (ref 4.2–6.5)

## 2024-01-31 PROCEDURE — 99214 OFFICE O/P EST MOD 30 MIN: CPT | Performed by: NURSE PRACTITIONER

## 2024-01-31 PROCEDURE — 1125F AMNT PAIN NOTED PAIN PRSNT: CPT | Performed by: NURSE PRACTITIONER

## 2024-01-31 PROCEDURE — 1159F MED LIST DOCD IN RCRD: CPT | Performed by: NURSE PRACTITIONER

## 2024-01-31 PROCEDURE — 1160F RVW MEDS BY RX/DR IN RCRD: CPT | Performed by: NURSE PRACTITIONER

## 2024-01-31 PROCEDURE — 83036 HEMOGLOBIN GLYCOSYLATED A1C: CPT | Performed by: NURSE PRACTITIONER

## 2024-01-31 PROCEDURE — 3078F DIAST BP <80 MM HG: CPT | Performed by: NURSE PRACTITIONER

## 2024-01-31 PROCEDURE — 3075F SYST BP GE 130 - 139MM HG: CPT | Performed by: NURSE PRACTITIONER

## 2024-01-31 PROCEDURE — 95251 CONT GLUC MNTR ANALYSIS I&R: CPT | Performed by: NURSE PRACTITIONER

## 2024-01-31 PROCEDURE — 1036F TOBACCO NON-USER: CPT | Performed by: NURSE PRACTITIONER

## 2024-01-31 RX ORDER — INSULIN DEGLUDEC 100 U/ML
INJECTION, SOLUTION SUBCUTANEOUS
Qty: 30 ML | Refills: 3 | Status: SHIPPED | OUTPATIENT
Start: 2024-01-31 | End: 2024-02-17 | Stop reason: HOSPADM

## 2024-01-31 ASSESSMENT — PATIENT HEALTH QUESTIONNAIRE - PHQ9
2. FEELING DOWN, DEPRESSED OR HOPELESS: NOT AT ALL
SUM OF ALL RESPONSES TO PHQ9 QUESTIONS 1 & 2: 0
1. LITTLE INTEREST OR PLEASURE IN DOING THINGS: NOT AT ALL

## 2024-01-31 ASSESSMENT — PAIN SCALES - GENERAL: PAINLEVEL: 2

## 2024-01-31 NOTE — PATIENT INSTRUCTIONS
TRESIBA 25 UNITS DAILY     SCALE IF BS OVER 151    Blood Sugar.................Units   .........................0   151-200.......................1   201-250.......................2  251-300.......................3  301-350.......................4   351-400.......................5  401 or greater..........6

## 2024-01-31 NOTE — PROGRESS NOTES
Presents for follow up/metabolic management 77 yo with DM Type 2 diagnosed in her 40s. History CAD, HTN, HLD, +smoker, cva 09/2023. Current A1C 6.8% was 6.4%. Patient testing sugars 6 times day., having 0-1 lows/week. Following carb controlled diet somewhat and know reasonable carb allowances. Patient able to afford their medications. Patient is exercising/very active.          -CGM Medhat 2 sensor with reader. Currently on insulin checking BS at least 4 xs a day adjustments made based on readings/frequent visits to manage.         -INSULIN Tresiba 25 units (up to 28 units in rehab) Humalog ISF 50>150 (was 30) ICR 1:4 breakfast 1:5 Lunch/Dinner         -Metformin 1 gram         -HTN Losartan 100 mg/HCTZ Metoprolol Amlodipine 10 mg daily at goal         -STATIN Atorvastatin 40 mg/Zetia LDLCALC 47            Medhat report downloaded and attached, 90 day 68% time in target, 2% lows, avg 157. Wakes up later morning, BS spikes after lunch dinner, few lows early morning. Post prandial lows, over correcting.    HPI             Current Outpatient Medications:     albuterol 5 mg/mL nebulizer solution, Take 0.5 mL (2.5 mg) by nebulization every 6 hours if needed for wheezing., Disp: , Rfl:     ALPRAZolam (Xanax) 0.25 mg tablet, Take 1 tablet (0.25 mg) by mouth 2 times a day., Disp: , Rfl:     amLODIPine (Norvasc) 10 mg tablet, Take 1 tablet (10 mg) by mouth once daily., Disp: 90 tablet, Rfl: 3    apixaban (Eliquis) 5 mg tablet, Take 1 tablet (5 mg) by mouth 2 times a day., Disp: , Rfl:     atorvastatin (Lipitor) 40 mg tablet, Take 1 tablet (40 mg) by mouth once daily., Disp: , Rfl:     Breztri Aerosphere 160-9-4.8 mcg/actuation HFA aerosol inhaler, Inhale 2 puffs 2 times a day., Disp: , Rfl:     ezetimibe (Zetia) 10 mg tablet, Take 1 tablet (10 mg) by mouth once daily., Disp: , Rfl:     gabapentin (Neurontin) 300 mg capsule, Take 1 capsule (300 mg) by mouth once daily., Disp: , Rfl:     HumaLOG KwikPen Insulin 100 unit/mL  "injection, Inject 60 Units under the skin once daily., Disp: , Rfl:     insulin glargine (Lantus U-100 Insulin) 100 unit/mL injection, Lantus 100 UNIT/ML Subcutaneous Solution  Refills: 0      Start : 16-Mar-2021  Active 10 ML Vial, Disp: , Rfl:     isosorbide mononitrate ER (Imdur) 30 mg 24 hr tablet, Take 1 tablet (30 mg) by mouth once daily. Do not crush or chew., Disp: 30 tablet, Rfl: 11    latanoprost (Xalatan) 0.005 % ophthalmic solution, Administer 1 drop into both eyes once daily., Disp: 7.5 mL, Rfl: 3    losartan (Cozaar) 100 mg tablet, Take 1 tablet (100 mg) by mouth once daily., Disp: 30 tablet, Rfl: 11    meloxicam (Mobic) 15 mg tablet, Take 1 tablet (15 mg) by mouth 3 (three) times a week., Disp: , Rfl:     metFORMIN (Glucophage) 500 mg tablet, Take 1 tablet (500 mg) by mouth 2 times a day with meals., Disp: , Rfl:     metoprolol succinate XL (Toprol-XL) 25 mg 24 hr tablet, Take 1 tablet (25 mg) by mouth once daily., Disp: 90 tablet, Rfl: 3    nystatin (Mycostatin) 100,000 unit/mL suspension, Take 10 mL (1,000,000 Units) by mouth once daily., Disp: , Rfl:     omeprazole (PriLOSEC) 40 mg DR capsule, Take 1 capsule (40 mg) by mouth. Do not crush or chew., Disp: , Rfl:     Sure Comfort Pen Needle 32 gauge x 5/32\" needle, , Disp: , Rfl:     insulin lispro (HumaLOG) 100 unit/mL injection, INJECT AS DIRECTED UNDER THE SKIN BEFORE MEALS UP TO 60 UNITS DAILY, Disp: 60 mL, Rfl: 3    Tresiba FlexTouch U-100 100 unit/mL (3 mL) injection, 30 UNITS DAILY DISP 30 ML 90 DAY SUPPLY REFILL 3, Disp: 30 mL, Rfl: 3      Allergies as of 01/31/2024 - Reviewed 01/31/2024   Allergen Reaction Noted    Chlorzoxazone Unknown 09/02/2023    Other Other 09/02/2023    Sulfa (sulfonamide antibiotics) Unknown 09/02/2023         Review of Systems   Cardiology: Lightheadedness-denies.  Chest pain-denies.  Leg edema-denies.  Palpitations-denies.  Respiratory: Cough-denies. Shortness of breath-denies.  Wheezing-denies.  Gastroenterology: " Constipation-denies.  Diarrhea-denies.  Heartburn-denies.  Endocrinology: Cold intolerance-denies.  Heat intolerance-denies.  Sweats-denies.  Neurology: Headache-denies.  Tremor-denies.  Neuropathy in extremities-denies.  Psychology: Low energy-denies.  Irritability-denies.  Sleep disturbances-denies.      /68 (BP Location: Left arm, Patient Position: Sitting)   Pulse 99   Wt 64 kg (141 lb)   BMI 24.20 kg/m²       Labs:  Lab Results   Component Value Date    WBC 8.2 09/17/2023    NRBC 0 09/17/2023    RBC 5.10 (H) 09/17/2023    HGB 15.1 (H) 09/17/2023    HCT 44.7 (H) 09/17/2023     09/17/2023     Lab Results   Component Value Date    CALCIUM 9.4 09/18/2023    AST 22 09/11/2023    ALKPHOS 92 03/01/2019    BILITOT 0.5 03/01/2019    PROT 6.5 03/01/2019    ALBUMIN 4.1 03/01/2019     09/18/2023    K 3.9 09/18/2023    CL 95 (L) 09/18/2023    CO2 27 09/18/2023    ANIONGAP 13 09/18/2023    BUN 14 09/18/2023    CREATININE 0.8 09/18/2023    UREACREAUR 17.5 09/18/2023    GLUCOSE 159 (H) 09/18/2023    ALT 21 09/11/2023    EGFR 76 09/18/2023     Lab Results   Component Value Date    CHOL 121 (L) 09/18/2023    TRIG 90 09/18/2023    HDL 56 09/18/2023    LDLCALC 47 (L) 09/18/2023       Lab Results   Component Value Date    TSH 1.14 08/27/2019     Lab Results   Component Value Date    [id number] 898 08/27/2019     Lab Results   Component Value Date    HGBA1C 6.8 (A) 01/31/2024         Physical Exam   General Appearance: pleasant, cooperative, no acute distress  HEENT: no chemosis, no proptosis, no lid lag, no lid retraction  Neck: no lymphadenopathy, no thyromegaly, no dominant thyroid nodules  Heart: no murmurs, regular rate and rhythm, S1 and S2  Lungs: wheezes, scattered rhonchi, no rales  Extremities: no lower extremity swelling, right sided weakness upper and lower      Assessment/Plan   1. Type 2 diabetes mellitus with hyperglycemia, with long-term current use of insulin (CMS/[facility])  -excellent A1c  control  -waking up near and under 100 more often since she has been home from rehab, basal decreased  -post prandial lows, ISF changed to 50>150  -consider GLP1 for CV benefits but cautious about weight loss   -meet with PharmD regarding patient assistance forms given   -consider increasing ICR if still have post prandial lows    - POCT glycosylated hemoglobin (Hb A1C) manually resulted  - Tresiba FlexTouch U-100 100 unit/mL (3 mL) injection; 30 UNITS DAILY DISP 30 ML 90 DAY SUPPLY REFILL 3  Dispense: 30 mL; Refill: 3    2. Dyslipidemia  -LDL target < 50  -tolerates statin     3. Benign essential hypertension  -at target      Follow Up: 4 months CNP meet with PharmD for patient assistance when possible    -labs/tests/notes reviewed  -reviewed and counseled patient on medication monitoring and side effects  Medical Decision Making  Complexity of problem: Chronic illness of diabetes mellitus uncontrolled, progressing  Data analyzed and reviewed: Reviewed prior notes, blood glucose data, labs including HgbA1c, lipids, serum chemistries.  Ordered tests.   Risk of complications and morbidities: Is definite because of use of insulin and risk of hypoglycemia.  Prescription medications reviewed and modifications made.  Compliance assessed.  Addressed social determinants of health including food insecurity.

## 2024-02-05 ENCOUNTER — LAB (OUTPATIENT)
Dept: LAB | Facility: LAB | Age: 79
End: 2024-02-05
Payer: MEDICARE

## 2024-02-05 DIAGNOSIS — R35.0 FREQUENCY OF MICTURITION: ICD-10-CM

## 2024-02-05 DIAGNOSIS — I63.9 CEREBRAL INFARCTION, UNSPECIFIED (MULTI): Primary | ICD-10-CM

## 2024-02-05 DIAGNOSIS — R20.2 PARESTHESIA OF SKIN: ICD-10-CM

## 2024-02-05 DIAGNOSIS — E78.5 DYSLIPIDEMIA: ICD-10-CM

## 2024-02-05 LAB
ALT SERPL W P-5'-P-CCNC: 25 U/L (ref 7–45)
APPEARANCE UR: CLEAR
AST SERPL W P-5'-P-CCNC: 24 U/L (ref 9–39)
BILIRUB UR STRIP.AUTO-MCNC: NEGATIVE MG/DL
CHOLEST SERPL-MCNC: 169 MG/DL (ref 0–199)
CHOLESTEROL/HDL RATIO: 2.6
COLOR UR: YELLOW
GLUCOSE UR STRIP.AUTO-MCNC: NEGATIVE MG/DL
HDLC SERPL-MCNC: 63.9 MG/DL
KETONES UR STRIP.AUTO-MCNC: ABNORMAL MG/DL
LDLC SERPL CALC-MCNC: 78 MG/DL
LEUKOCYTE ESTERASE UR QL STRIP.AUTO: NEGATIVE
NITRITE UR QL STRIP.AUTO: NEGATIVE
NON HDL CHOLESTEROL: 105 MG/DL (ref 0–149)
PH UR STRIP.AUTO: 8 [PH]
PROT UR STRIP.AUTO-MCNC: ABNORMAL MG/DL
RBC # UR STRIP.AUTO: NEGATIVE /UL
RBC #/AREA URNS AUTO: NORMAL /HPF
SP GR UR STRIP.AUTO: 1.01
SQUAMOUS #/AREA URNS AUTO: NORMAL /HPF
TRIGL SERPL-MCNC: 137 MG/DL (ref 0–149)
UROBILINOGEN UR STRIP.AUTO-MCNC: <2 MG/DL
VLDL: 27 MG/DL (ref 0–40)
WBC #/AREA URNS AUTO: NORMAL /HPF

## 2024-02-05 PROCEDURE — 84450 TRANSFERASE (AST) (SGOT): CPT

## 2024-02-05 PROCEDURE — 84460 ALANINE AMINO (ALT) (SGPT): CPT

## 2024-02-05 PROCEDURE — 36415 COLL VENOUS BLD VENIPUNCTURE: CPT

## 2024-02-05 PROCEDURE — 80061 LIPID PANEL: CPT

## 2024-02-05 PROCEDURE — 81001 URINALYSIS AUTO W/SCOPE: CPT

## 2024-02-16 ENCOUNTER — APPOINTMENT (OUTPATIENT)
Dept: RADIOLOGY | Facility: HOSPITAL | Age: 79
End: 2024-02-16
Payer: MEDICARE

## 2024-02-16 ENCOUNTER — HOSPITAL ENCOUNTER (INPATIENT)
Facility: HOSPITAL | Age: 79
LOS: 1 days | Discharge: HOSPICE | DRG: 951 | End: 2024-02-17
Attending: STUDENT IN AN ORGANIZED HEALTH CARE EDUCATION/TRAINING PROGRAM | Admitting: STUDENT IN AN ORGANIZED HEALTH CARE EDUCATION/TRAINING PROGRAM
Payer: MEDICARE

## 2024-02-16 ENCOUNTER — HOSPITAL ENCOUNTER (EMERGENCY)
Facility: HOSPITAL | Age: 79
Discharge: OTHER INSTITUTION | End: 2024-02-16
Attending: EMERGENCY MEDICINE
Payer: MEDICARE

## 2024-02-16 ENCOUNTER — APPOINTMENT (OUTPATIENT)
Dept: RADIOLOGY | Facility: HOSPITAL | Age: 79
DRG: 951 | End: 2024-02-16
Payer: MEDICARE

## 2024-02-16 ENCOUNTER — APPOINTMENT (OUTPATIENT)
Dept: CARDIOLOGY | Facility: HOSPITAL | Age: 79
End: 2024-02-16
Payer: MEDICARE

## 2024-02-16 VITALS
TEMPERATURE: 97.3 F | WEIGHT: 143.74 LBS | SYSTOLIC BLOOD PRESSURE: 145 MMHG | DIASTOLIC BLOOD PRESSURE: 60 MMHG | BODY MASS INDEX: 24.67 KG/M2 | RESPIRATION RATE: 23 BRPM | HEART RATE: 83 BPM | OXYGEN SATURATION: 96 %

## 2024-02-16 DIAGNOSIS — I61.5 INTRAVENTRICULAR HEMORRHAGE (MULTI): Primary | ICD-10-CM

## 2024-02-16 DIAGNOSIS — I61.9 HEMORRHAGIC CEREBROVASCULAR ACCIDENT (CVA) (MULTI): Primary | ICD-10-CM

## 2024-02-16 DIAGNOSIS — Z79.01 ENCOUNTER FOR CURRENT LONG-TERM USE OF ANTICOAGULANTS: ICD-10-CM

## 2024-02-16 LAB
ABO GROUP (TYPE) IN BLOOD: NORMAL
ABO GROUP (TYPE) IN BLOOD: NORMAL
ALBUMIN SERPL-MCNC: 4.4 G/DL (ref 3.5–5)
ALP BLD-CCNC: 65 U/L (ref 35–125)
ALT SERPL-CCNC: 22 U/L (ref 5–40)
ANION GAP SERPL CALC-SCNC: 13 MMOL/L
ANTIBODY SCREEN: NORMAL
ANTIBODY SCREEN: NORMAL
APPEARANCE UR: CLEAR
AST SERPL-CCNC: 24 U/L (ref 5–40)
BASOPHILS # BLD AUTO: 0.03 X10*3/UL (ref 0–0.1)
BASOPHILS NFR BLD AUTO: 0.2 %
BILIRUB SERPL-MCNC: 0.4 MG/DL (ref 0.1–1.2)
BILIRUB UR STRIP.AUTO-MCNC: NEGATIVE MG/DL
BUN SERPL-MCNC: 18 MG/DL (ref 8–25)
CALCIUM SERPL-MCNC: 9.8 MG/DL (ref 8.5–10.4)
CHLORIDE SERPL-SCNC: 94 MMOL/L (ref 97–107)
CO2 SERPL-SCNC: 27 MMOL/L (ref 24–31)
COLOR UR: ABNORMAL
CREAT SERPL-MCNC: 0.7 MG/DL (ref 0.4–1.6)
EGFRCR SERPLBLD CKD-EPI 2021: 90 ML/MIN/1.73M*2
EOSINOPHIL # BLD AUTO: 0 X10*3/UL (ref 0–0.4)
EOSINOPHIL NFR BLD AUTO: 0 %
ERYTHROCYTE [DISTWIDTH] IN BLOOD BY AUTOMATED COUNT: 13.2 % (ref 11.5–14.5)
GLUCOSE BLD MANUAL STRIP-MCNC: 259 MG/DL (ref 74–99)
GLUCOSE SERPL-MCNC: 304 MG/DL (ref 65–99)
GLUCOSE UR STRIP.AUTO-MCNC: ABNORMAL MG/DL
HCT VFR BLD AUTO: 37.3 % (ref 36–46)
HGB BLD-MCNC: 12.2 G/DL (ref 12–16)
IMM GRANULOCYTES # BLD AUTO: 0.1 X10*3/UL (ref 0–0.5)
IMM GRANULOCYTES NFR BLD AUTO: 0.5 % (ref 0–0.9)
INR PPP: 1.1 (ref 0.9–1.2)
KETONES UR STRIP.AUTO-MCNC: ABNORMAL MG/DL
LEUKOCYTE ESTERASE UR QL STRIP.AUTO: NEGATIVE
LYMPHOCYTES # BLD AUTO: 0.83 X10*3/UL (ref 0.8–3)
LYMPHOCYTES NFR BLD AUTO: 4.2 %
MCH RBC QN AUTO: 30.5 PG (ref 26–34)
MCHC RBC AUTO-ENTMCNC: 32.7 G/DL (ref 32–36)
MCV RBC AUTO: 93 FL (ref 80–100)
MONOCYTES # BLD AUTO: 0.89 X10*3/UL (ref 0.05–0.8)
MONOCYTES NFR BLD AUTO: 4.5 %
NEUTROPHILS # BLD AUTO: 17.79 X10*3/UL (ref 1.6–5.5)
NEUTROPHILS NFR BLD AUTO: 90.6 %
NITRITE UR QL STRIP.AUTO: NEGATIVE
NRBC BLD-RTO: 0 /100 WBCS (ref 0–0)
PH UR STRIP.AUTO: 6.5 [PH]
PLATELET # BLD AUTO: 359 X10*3/UL (ref 150–450)
POTASSIUM SERPL-SCNC: 3.9 MMOL/L (ref 3.4–5.1)
PROT SERPL-MCNC: 7.6 G/DL (ref 5.9–7.9)
PROT UR STRIP.AUTO-MCNC: ABNORMAL MG/DL
PROTHROMBIN TIME: 11.1 SECONDS (ref 9.3–12.7)
RBC # BLD AUTO: 4 X10*6/UL (ref 4–5.2)
RBC # UR STRIP.AUTO: NEGATIVE /UL
RBC #/AREA URNS AUTO: NORMAL /HPF
RH FACTOR (ANTIGEN D): NORMAL
RH FACTOR (ANTIGEN D): NORMAL
SODIUM SERPL-SCNC: 134 MMOL/L (ref 133–145)
SP GR UR STRIP.AUTO: 1.01
SQUAMOUS #/AREA URNS AUTO: NORMAL /HPF
TROPONIN T SERPL-MCNC: 28 NG/L
UROBILINOGEN UR STRIP.AUTO-MCNC: NORMAL MG/DL
WBC # BLD AUTO: 19.6 X10*3/UL (ref 4.4–11.3)
WBC #/AREA URNS AUTO: NORMAL /HPF

## 2024-02-16 PROCEDURE — 93005 ELECTROCARDIOGRAM TRACING: CPT

## 2024-02-16 PROCEDURE — 71045 X-RAY EXAM CHEST 1 VIEW: CPT

## 2024-02-16 PROCEDURE — 80053 COMPREHEN METABOLIC PANEL: CPT | Performed by: PHYSICIAN ASSISTANT

## 2024-02-16 PROCEDURE — 85025 COMPLETE CBC W/AUTO DIFF WBC: CPT | Performed by: PHYSICIAN ASSISTANT

## 2024-02-16 PROCEDURE — 85610 PROTHROMBIN TIME: CPT | Performed by: PHYSICIAN ASSISTANT

## 2024-02-16 PROCEDURE — 99223 1ST HOSP IP/OBS HIGH 75: CPT | Performed by: NEUROLOGICAL SURGERY

## 2024-02-16 PROCEDURE — 70450 CT HEAD/BRAIN W/O DYE: CPT

## 2024-02-16 PROCEDURE — 99285 EMERGENCY DEPT VISIT HI MDM: CPT | Performed by: STUDENT IN AN ORGANIZED HEALTH CARE EDUCATION/TRAINING PROGRAM

## 2024-02-16 PROCEDURE — 2500000001 HC RX 250 WO HCPCS SELF ADMINISTERED DRUGS (ALT 637 FOR MEDICARE OP): Performed by: EMERGENCY MEDICINE

## 2024-02-16 PROCEDURE — 84484 ASSAY OF TROPONIN QUANT: CPT | Performed by: PHYSICIAN ASSISTANT

## 2024-02-16 PROCEDURE — 82947 ASSAY GLUCOSE BLOOD QUANT: CPT

## 2024-02-16 PROCEDURE — 81001 URINALYSIS AUTO W/SCOPE: CPT

## 2024-02-16 PROCEDURE — 96374 THER/PROPH/DIAG INJ IV PUSH: CPT

## 2024-02-16 PROCEDURE — 99291 CRITICAL CARE FIRST HOUR: CPT | Mod: 25

## 2024-02-16 PROCEDURE — 2500000004 HC RX 250 GENERAL PHARMACY W/ HCPCS (ALT 636 FOR OP/ED): Performed by: EMERGENCY MEDICINE

## 2024-02-16 PROCEDURE — 86900 BLOOD TYPING SEROLOGIC ABO: CPT

## 2024-02-16 PROCEDURE — 86901 BLOOD TYPING SEROLOGIC RH(D): CPT | Performed by: EMERGENCY MEDICINE

## 2024-02-16 PROCEDURE — 36415 COLL VENOUS BLD VENIPUNCTURE: CPT | Performed by: EMERGENCY MEDICINE

## 2024-02-16 PROCEDURE — 6360000002 HC RX 636 FACTOR: Mod: JZ | Performed by: EMERGENCY MEDICINE

## 2024-02-16 PROCEDURE — 1210000001 HC SEMI-PRIVATE ROOM DAILY

## 2024-02-16 PROCEDURE — 36415 COLL VENOUS BLD VENIPUNCTURE: CPT | Performed by: PHYSICIAN ASSISTANT

## 2024-02-16 PROCEDURE — A4217 STERILE WATER/SALINE, 500 ML: HCPCS | Performed by: EMERGENCY MEDICINE

## 2024-02-16 PROCEDURE — 99285 EMERGENCY DEPT VISIT HI MDM: CPT | Mod: 25 | Performed by: EMERGENCY MEDICINE

## 2024-02-16 RX ORDER — ACETAMINOPHEN 325 MG/1
650 TABLET ORAL ONCE
Status: DISCONTINUED | OUTPATIENT
Start: 2024-02-16 | End: 2024-02-16

## 2024-02-16 RX ORDER — ACETAMINOPHEN 650 MG/1
650 SUPPOSITORY RECTAL ONCE
Status: COMPLETED | OUTPATIENT
Start: 2024-02-16 | End: 2024-02-16

## 2024-02-16 RX ORDER — FENTANYL CITRATE 50 UG/ML
25 INJECTION, SOLUTION INTRAMUSCULAR; INTRAVENOUS ONCE
Status: DISCONTINUED | OUTPATIENT
Start: 2024-02-16 | End: 2024-02-16 | Stop reason: HOSPADM

## 2024-02-16 RX ORDER — POLYETHYLENE GLYCOL 3350 17 G/17G
17 POWDER, FOR SOLUTION ORAL DAILY
Status: DISCONTINUED | OUTPATIENT
Start: 2024-02-17 | End: 2024-02-16

## 2024-02-16 RX ADMIN — ACETAMINOPHEN 650 MG: 650 SUPPOSITORY RECTAL at 16:13

## 2024-02-16 RX ADMIN — Medication 2000 UNITS: at 16:24

## 2024-02-16 ASSESSMENT — COLUMBIA-SUICIDE SEVERITY RATING SCALE - C-SSRS
6. HAVE YOU EVER DONE ANYTHING, STARTED TO DO ANYTHING, OR PREPARED TO DO ANYTHING TO END YOUR LIFE?: NO
2. HAVE YOU ACTUALLY HAD ANY THOUGHTS OF KILLING YOURSELF?: NO
1. IN THE PAST MONTH, HAVE YOU WISHED YOU WERE DEAD OR WISHED YOU COULD GO TO SLEEP AND NOT WAKE UP?: NO

## 2024-02-16 ASSESSMENT — PAIN - FUNCTIONAL ASSESSMENT
PAIN_FUNCTIONAL_ASSESSMENT: 0-10
PAIN_FUNCTIONAL_ASSESSMENT: 0-10

## 2024-02-16 ASSESSMENT — PAIN SCALES - GENERAL: PAINLEVEL_OUTOF10: 0 - NO PAIN

## 2024-02-16 NOTE — ED PROVIDER NOTES
HPI   Chief Complaint   Patient presents with    Altered Mental Status     Pt is not talking. Pt is very weak. Hx of stroke. Lkw yesterday       78-year-old female with a past medical history of stroke with right-sided deficit on Eliquis presenting emergency department with a chief complaint of aphasia, worsening weakness of right upper and lower extremity, confusion.  Her last known well was last night.  The history is provided by her grandson.  She was attempting to answer but had difficulty with word finding and speech is nonsensical.  She denies fall or injury or trauma.  Nontoxic-appearing.  No other complaint.                          [place] Coma Scale Score: 13                     Patient History   Past Medical History:   Diagnosis Date    Acute upper respiratory infection, unspecified 02/27/2014    Acute URI    Anxiety disorder, unspecified 05/15/2017    Anxiety    Bee allergy status 05/15/2017    Bee sting allergy    Body mass index (BMI) 29.0-29.9, adult 08/27/2019    BMI 29.0-29.9,adult    Chronic obstructive pulmonary disease with (acute) exacerbation (CMS/[facility]) 02/27/2014    COPD with exacerbation    Chronic obstructive pulmonary disease, unspecified (CMS/[facility]) 06/09/2020    Chronic obstructive pulmonary disease    Chronic rhinitis 04/11/2017    Rhinitis    Depression, unspecified 04/11/2017    Depression    Dry eye syndrome of right lacrimal gland 03/07/2017    Dry eye syndrome of right lacrimal gland    Encounter for immunization 04/11/2017    Need for influenza vaccination    Epistaxis     Frequent nosebleeds    Hallux valgus (acquired), unspecified foot 04/11/2017    Hallux valgus    Impacted cerumen, left ear 04/11/2017    Excessive cerumen in left ear canal    Lateral epicondylitis, right elbow 04/11/2017    Lateral epicondylitis of right elbow    Localized swelling, mass and lump, unspecified 04/04/2014    Lump of skin    Osteoarthritis of knee, unspecified 04/11/2017    Osteoarthritis of knee     Other conditions influencing health status     Right handed    Other conditions influencing health status 04/11/2017    Type 1 diabetes, uncontrolled, with peripheral circulatory disorder    Other conditions influencing health status 11/30/2013    Obstructive Chronic Bronchitis With Acute Exacerbation    Other conditions influencing health status 11/01/2013    Epiphora Of The Right Eye    Other hammer toe(s) (acquired), unspecified foot 04/11/2017    Hammer toe    Other specified disorders of bone, unspecified site 04/11/2017    Exostosis    Other tear of medial meniscus, current injury, unspecified knee, initial encounter 02/14/2014    Acute medial meniscus tear    Pain in left knee 01/31/2014    Left knee pain    Pain in leg, unspecified 02/19/2013    Lower extremity pain    Pain in unspecified foot 04/11/2017    Foot pain    Pain in unspecified knee 08/05/2013    Joint pain, knee    Paresthesia of skin 04/11/2017    Tingling    Personal history of other diseases of the circulatory system     History of hypertension    Personal history of other diseases of the circulatory system     History of hypertension    Personal history of other diseases of the musculoskeletal system and connective tissue     History of arthritis    Personal history of other diseases of the nervous system and sense organs     History of cataract    Personal history of other diseases of the respiratory system     History of bronchitis    Personal history of other diseases of the respiratory system 01/31/2014    History of common cold    Personal history of other diseases of the respiratory system 02/03/2015    History of acute sinusitis    Personal history of other endocrine, nutritional and metabolic disease     History of hyperlipidemia    Personal history of other specified conditions     History of shortness of breath    Postnasal drip 05/15/2017    Postnasal drip    Presence of intraocular lens 03/07/2017    Pseudophakia of left eye     Primary focal hyperhidrosis, 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    Pure hypercholesterolemia, unspecified     High cholesterol    Sleep apnea, unspecified     Sleep apnea, unspecified type    Snoring     Snoring    Spondylosis without myelopathy or radiculopathy, lumbar region 2017    Lumbar spondylosis    Trigger finger, left middle finger 2017    Trigger middle finger of left hand    Type 1 diabetes mellitus with mild nonproliferative diabetic retinopathy without macular edema, bilateral (CMS/[facility]) 2019    Type 1 diabetes mellitus with mild nonproliferative diabetic retinopathy without macular edema, bilateral    Type 2 diabetes mellitus without complications (CMS/[facility]) 2022    Diabetes mellitus    Unspecified disorder of nose and nasal sinuses     Sinus trouble    Zoster without complications 2017    Shingles     Past Surgical History:   Procedure Laterality Date    APPENDECTOMY  2017    Appendectomy    BI STEREOTACTIC GUIDED BREAST RIGHT LOCALIZATION AND BIOPSY Right 2019    BI STEREOTACTIC GUIDED BREAST LOCALIZATION AND BIOPSY RIGHT [facility]    BUNIONECTOMY  2017    Simple Bunion Exostectomy ([facility])    CATARACT EXTRACTION  2017    Cataract Surgery    KNEE SURGERY  2017    Knee Surgery    MR HEAD ANGIO WO IV CONTRAST  2023    MR HEAD ANGIO WO IV CONTRAST [facility]IENT LEGAC[facility]    OTHER SURGICAL HISTORY  2017    Excision Of Lesion Genitalia Benign     Family History   Problem Relation Name Age of Onset    Mental illness Sister          passed from mental illness     Social History     Tobacco Use    Smoking status: Former     Types: Cigarettes     Quit date: 2023     Years since quittin.4    Smokeless tobacco: Never   Substance Use Topics    Alcohol use: Never    Drug use: Never       Physical Exam   ED Triage Vitals [24 1518]   Temperature Heart Rate Respirations BP   36.3 °C (97.3 °F) 82 18 148/70       Pulse Ox Temp src Heart Rate Source Patient Position   (!) 92 % -- -- --      BP Location FiO2 (%)     -- --       Physical Exam  Vitals and nursing note reviewed.   Constitutional:       Appearance: She is well-developed.   HENT:      Head: Normocephalic.   Cardiovascular:      Rate and Rhythm: Normal rate and regular rhythm.   Pulmonary:      Effort: Pulmonary effort is normal.   Musculoskeletal:         General: Normal range of motion.   Skin:     General: Skin is warm.   Neurological:      Mental Status: She is alert.      Comments: See NIH   Psychiatric:         Mood and Affect: Mood normal.         Behavior: Behavior normal.         ED Course & MDM   ED Course as of 02/16/24 1647   Fri Feb 16, 2024   1543 Call received from the transfer center.  Patient was excepted by neurosurgery in consultation.  Awaiting to give report to the excepting emergency department physician. [EF]      ED Course User Index  [EF] Rola Lal,          Diagnoses as of 02/16/24 1647   Hemorrhagic cerebrovascular accident (CVA) (CMS/[facility])   Encounter for current long-term use of anticoagulants       Medical Decision Making  I have seen and evaluated this patient.  The attending physician has also seen and evaluated this patient.  Vital signs, laboratory testing and diagnostic images if applicable have been reviewed.  All laboratory and imaging is interpreted by myself unless otherwise stated.  Radiology studies are also formally interpreted by radiologist.    Brain attack called in triage room by myself.  CT demonstrates a large bleed.  Patient with NIH of 14.  Obviously not thrombolytic candidate.  Transfer center consulted.  Patient will be transferred to [facility] for further treatment management.    I have seen and evaluated this patient and independently provided 31 minutes of nonconcurrent critical care time. This does not include separately billable procedures. Patient with high potential for deterioration, required  frequent monitoring and assessment.    Labs Reviewed  CBC WITH AUTO DIFFERENTIAL - Abnormal     WBC                           19.6 (*)               nRBC                          0.0                    RBC                           4.00                   Hemoglobin                    12.2                   Hematocrit                    37.3                   MCV                           93                     MCH                           30.5                   MCHC                          32.7                   RDW                           13.2                   Platelets                     359                    Neutrophils %                 90.6                   Immature Granulocytes %, Automated   0.5                    Lymphocytes %                 4.2                    Monocytes %                   4.5                    Eosinophils %                 0.0                    Basophils %                   0.2                    Neutrophils Absolute          17.79 (*)               Immature Granulocytes Absolute, Au*   0.10                   Lymphocytes Absolute          0.83                   Monocytes Absolute            0.89 (*)               Eosinophils Absolute          0.00                   Basophils Absolute            0.03                POCT GLUCOSE - Abnormal     POCT Glucose                  259 (*)             PROTIME-INR - Normal     Protime                       11.1                   INR                           1.1                      Narrative: INR Therapeutic Range: 2.0-3.5  TYPE AND SCREEN     ABO TYPE                      O                      Rh TYPE                       POS                    ANTIBODY SCREEN               NEG                 COMPREHENSIVE METABOLIC PANEL  TROPONIN T SERIES, HIGH SENSITIVITY (0, 2 HR, 6 HR)       Narrative: The following orders were created for panel order Troponin T Series, High Sensitivity (0, 2HR, 6HR).                Procedure                                Abnormality         Status                                   ---------                               -----------         ------                                   Serial Troponin, Initial...[[id number]]                      In process                               Serial Troponin, 2 Hour ...[[id number]]                                                                               Please view results for these tests on the individual orders.  SERIAL TROPONIN, INITIAL ([facility])  SERIAL TROPONIN,  2 HOUR ([facility])  SERIAL TROPONIN,  2 HOUR ([facility])  XR chest 1 view   Final Result    No acute cardiopulmonary disease.          Signed by: Ramiro Gray 2/16/2024 4:25 PM    Dictation workstation:   [id number]     CT brain attack head wo IV contrast   Final Result    Diffuse intraventricular hemorrhage, however, much greater within the    left ventricular system. The ventricles are mildly enlarged which may    represent early hydrocephalus however there is no sulcal effacement    or significant mass effect at this time.                      Findings discussed via telephone with the emergency room at 3:36 p.m.    on 02/16/2024          Signed by: Ramiro Gray 2/16/2024 3:43 PM    Dictation workstation:   [id number]     CT angio head and neck w and wo IV contrast    (Results Pending)  Medications  fentaNYL PF (Sublimaze) injection 25 mcg (25 mcg intravenous Not Given 2/16/24 1600)  four factor human prothrombin complex concentrate (Kcentra) 2,000 Units in sterile water 80 mL infusion (2,000 Units intravenous New Bag 2/16/24 1624)  acetaminophen (Tylenol) suppository 650 mg (650 mg rectal Given 2/16/24 1613)  New Prescriptions  No medications on file            Procedure  Procedures     Shreyas Umanzor PA-C  02/16/24 1628

## 2024-02-16 NOTE — ED TRIAGE NOTES
Patient was transferred from [facility] due to being found down by her grandson. Last known well was yesterday. Right lower extremity flaccid, Right upper extremity minimal movement. Follow some commands not answering questions. K Centra given at previous hospital.

## 2024-02-16 NOTE — SIGNIFICANT EVENT
Expected patient note:    The patient is a 78-year-old seen at [facility], operated by [facility] due to intracranial hemorrhage.  The patient had last known well of yesterday afternoon coming in due to changes in her mental status that were noted first thing this morning.  Has a large left-sided intraparenchymal hemorrhage however no signs of trauma.  Neurosurgery accepted.  Patient is on Eliquis will receive Kcentra at [facility], operated by [facility] prior to transfer.  Otherwise following commands alert.  Discussion was had with the daughter who states that she would not like mechanical ventilation at this time.  The daughter's Annamarie Crawford [phone]       Luba Cole MD  [facility] Emergency Medicine   PGY-3

## 2024-02-16 NOTE — CONSULTS
Inpatient consult to Neurosurgery  Consult performed by: Lacie Sarabia MD  Consult ordered by: Phill Bauer MD      Date of Service:  2/16/2024 Attending Provider:  Phill Bauer MD     Reason for Consultation:  Malou Junior is being seen today for a consult requested by Phill Bauer MD for intraventricular hemorrhage.    Assessment/Plan   Assessment:  78yF h/o stroke (R sided weakness), CAD s/p cath 5/2023, started on eliquis for possible afib, HTN, HTN, COPD, T2DM, p/w aphasia and R sided weakness, CTH pan IVH    Plan:    Per patient's daughter, had discussed with OSH team and decided for patient to be DNAR/DNI. She said that since her last stroke in 9/2023, they have had multiple hard discussions, and her mom had told her she would not want to live if she can't talk or requires living in a nursing facility. Discussed with patient's daughter that her mom has a risk of developing hydrocephalus given the degree of blood in her brain, and talked about any invasive procedures. She said she wanted time to see her mom before she made anymore decisions.     Further recommendations pending family decision  For now, recommend CTA head and neck   SBP <140  Q1 neurochecks  EVD watch  Please ensure patient has CBC/RFP/coag/T&S/UA/UPT (if applicable)/EKG/CXR    Updated recs:    Touched base with family regarding goals of care, re-iterated patient's pathology and reviewed patient's imaging with family. Explained that the procedure patient would require if she declined would require her to be in the ICU for multiple days to weeks. Patient's daughter reported that patient would definitely not want any invasive procedures performed to prolong life, and would not want to spend the rest of her life confused in a nursing home. Family would like to continue to discuss medical management vs comfort care.    ED updated. Family requesting resources for hospice/palliative.    Plan was discussed with chief resident and  attending Dr. Gaxiola. Plan not final until note signed by attending.    Subjective   History of Present Illness:  Malou is a 78 y.o. female with No Principal Problem: There is no principal problem currently on the Problem List. Please update the Problem List and refresh.    Patient was having headaches and profuse diarrhea overnight, then had altered mental status so was brought to the outside hospital. Patient was found to be aphasic with worsened R weakness so CTH was obtained and patient was found to have pan IVH. Patient was made DNI and transferred to .    Patient denied any weakness, numbness, vision changes, fevers or chills.    Review of Systems negative other than listed in HPI.    Objective   Vitals:  There were no vitals filed for this visit.      Exam:  Constitutional: No acute distress  Resp: breathing comfortably  Cardio: well perfused  GI: nondistended  MSK: full range of motion  Neuro: Awake, says words, Ox0, mixed aphasia, dysarthria  face symmetric  RUE 4/5  LUE 5/5  RLE 3/5  LLE 5/5  R neglect  Psych: appropriate  Skin: no obvious lesions    Medical History  Past Medical History:   Diagnosis Date    Acute upper respiratory infection, unspecified 02/27/2014    Acute URI    Anxiety disorder, unspecified 05/15/2017    Anxiety    Bee allergy status 05/15/2017    Bee sting allergy    Body mass index (BMI) 29.0-29.9, adult 08/27/2019    BMI 29.0-29.9,adult    Chronic obstructive pulmonary disease with (acute) exacerbation (CMS/[facility]) 02/27/2014    COPD with exacerbation    Chronic obstructive pulmonary disease, unspecified (CMS/[facility]) 06/09/2020    Chronic obstructive pulmonary disease    Chronic rhinitis 04/11/2017    Rhinitis    Depression, unspecified 04/11/2017    Depression    Dry eye syndrome of right lacrimal gland 03/07/2017    Dry eye syndrome of right lacrimal gland    Encounter for immunization 04/11/2017    Need for influenza vaccination    Epistaxis     Frequent nosebleeds    Hallux valgus  (acquired), unspecified foot 04/11/2017    Hallux valgus    Impacted cerumen, left ear 04/11/2017    Excessive cerumen in left ear canal    Lateral epicondylitis, right elbow 04/11/2017    Lateral epicondylitis of right elbow    Localized swelling, mass and lump, unspecified 04/04/2014    Lump of skin    Osteoarthritis of knee, unspecified 04/11/2017    Osteoarthritis of knee    Other conditions influencing health status     Right handed    Other conditions influencing health status 04/11/2017    Type 1 diabetes, uncontrolled, with peripheral circulatory disorder    Other conditions influencing health status 11/30/2013    Obstructive Chronic Bronchitis With Acute Exacerbation    Other conditions influencing health status 11/01/2013    Epiphora Of The Right Eye    Other hammer toe(s) (acquired), unspecified foot 04/11/2017    Hammer toe    Other specified disorders of bone, unspecified site 04/11/2017    Exostosis    Other tear of medial meniscus, current injury, unspecified knee, initial encounter 02/14/2014    Acute medial meniscus tear    Pain in left knee 01/31/2014    Left knee pain    Pain in leg, unspecified 02/19/2013    Lower extremity pain    Pain in unspecified foot 04/11/2017    Foot pain    Pain in unspecified knee 08/05/2013    Joint pain, knee    Paresthesia of skin 04/11/2017    Tingling    Personal history of other diseases of the circulatory system     History of hypertension    Personal history of other diseases of the circulatory system     History of hypertension    Personal history of other diseases of the musculoskeletal system and connective tissue     History of arthritis    Personal history of other diseases of the nervous system and sense organs     History of cataract    Personal history of other diseases of the respiratory system     History of bronchitis    Personal history of other diseases of the respiratory system 01/31/2014    History of common cold    Personal history of other  diseases of the respiratory system 02/03/2015    History of acute sinusitis    Personal history of other endocrine, nutritional and metabolic disease     History of hyperlipidemia    Personal history of other specified conditions     History of shortness of breath    Postnasal drip 05/15/2017    Postnasal drip    Presence of intraocular lens 03/07/2017    Pseudophakia of left eye    Primary focal hyperhidrosis, 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    Pure hypercholesterolemia, unspecified     High cholesterol    Sleep apnea, unspecified     Sleep apnea, unspecified type    Snoring     Snoring    Spondylosis without myelopathy or radiculopathy, lumbar region 04/11/2017    Lumbar spondylosis    Trigger finger, left middle finger 04/11/2017    Trigger middle finger of left hand    Type 1 diabetes mellitus with mild nonproliferative diabetic retinopathy without macular edema, bilateral (CMS/[facility]) 05/21/2019    Type 1 diabetes mellitus with mild nonproliferative diabetic retinopathy without macular edema, bilateral    Type 2 diabetes mellitus without complications (CMS/[facility]) 07/21/2022    Diabetes mellitus    Unspecified disorder of nose and nasal sinuses     Sinus trouble    Zoster without complications 04/11/2017    Shingles       Surgical History  Past Surgical History:   Procedure Laterality Date    APPENDECTOMY  03/07/2017    Appendectomy    BI STEREOTACTIC GUIDED BREAST RIGHT LOCALIZATION AND BIOPSY Right 6/4/2019    BI STEREOTACTIC GUIDED BREAST LOCALIZATION AND BIOPSY RIGHT [facility]    BUNIONECTOMY  03/07/2017    Simple Bunion Exostectomy ([facility] Procedure)    CATARACT EXTRACTION  02/08/2017    Cataract Surgery    KNEE SURGERY  02/08/2017    Knee Surgery    MR HEAD ANGIO WO IV CONTRAST  9/18/2023    MR HEAD ANGIO WO IV CONTRAST [facility]PATIENT LE[facility]    OTHER SURGICAL HISTORY  03/07/2017    Excision Of Lesion Genitalia Benign        Medications  Current Outpatient Medications   Medication  "Instructions    albuterol 2.5 mg, nebulization, Every 6 hours PRN    ALPRAZolam (XANAX) 0.25 mg, oral, 2 times daily    amLODIPine (NORVASC) 10 mg, oral, Daily    apixaban (ELIQUIS) 5 mg, oral, 2 times daily    atorvastatin (LIPITOR) 40 mg, oral, Daily    Breztri Aerosphere 160-9-4.8 mcg/actuation HFA aerosol inhaler 2 puffs, inhalation, 2 times daily RT    ezetimibe (ZETIA) 10 mg, oral, Daily    gabapentin (NEURONTIN) 300 mg, oral, Daily    HumaLOG KwikPen Insulin 60 Units, subcutaneous, Daily    insulin glargine (Lantus U-100 Insulin) 100 unit/mL injection Lantus 100 UNIT/ML Subcutaneous Solution   Refills: 0        Start : 16-Mar-2021   Active  10 ML Vial    insulin lispro (HumaLOG) 100 unit/mL injection INJECT AS DIRECTED UNDER THE SKIN BEFORE MEALS UP TO 60 UNITS DAILY    isosorbide mononitrate ER (IMDUR) 30 mg, oral, Daily, Do not crush or chew.    latanoprost (Xalatan) 0.005 % ophthalmic solution 1 drop, Both Eyes, Daily    losartan (COZAAR) 100 mg, oral, Daily    meloxicam (MOBIC) 15 mg, oral, 3 times weekly    metFORMIN (GLUCOPHAGE) 500 mg, oral, 2 times daily with meals    metoprolol succinate XL (TOPROL-XL) 25 mg, oral, Daily    nystatin (Mycostatin) 100,000 unit/mL suspension 10 mL, oral, Daily    omeprazole (PRILOSEC) 40 mg, oral, Do not crush or chew.    Sure Comfort Pen Needle 32 gauge x 5/32\" needle     Tresiba FlexTouch U-100 100 unit/mL (3 mL) injection 30 UNITS DAILY DISP 30 ML 90 DAY SUPPLY REFILL 3        Diagnostic Results:    Lab Results   Component Value Date    WBC 19.6 (H) 02/16/2024    HGB 12.2 02/16/2024    HCT 37.3 02/16/2024    MCV 93 02/16/2024     02/16/2024     Lab Results   Component Value Date    CREATININE 0.70 02/16/2024    BUN 18 02/16/2024     02/16/2024    K 3.9 02/16/2024    CL 94 (L) 02/16/2024    CO2 27 02/16/2024     Lab Results   Component Value Date    INR 1.1 02/16/2024    INR 1.0 09/17/2023    PROTIME 11.1 02/16/2024    PROTIME 10.3 09/17/2023       === " 02/16/24 ===    CT BRAIN ATTACK HEAD WO CONTRAST    - Impression -  Diffuse intraventricular hemorrhage, however, much greater within the  left ventricular system. The ventricles are mildly enlarged which may  represent early hydrocephalus however there is no sulcal effacement  or significant mass effect at this time.        Findings discussed via telephone with the emergency room at 3:36 p.m.  on 02/16/2024    Signed by: Ramiro Gray 2/16/2024 3:43 PM  Dictation workstation:   [id number]  === 10/23/17 ===    MRI LUMBAR SPINE WO CONTRAST    - Impression -  Lumbar spondylosis with varying severity of central canal and neural  foraminal narrowing at multiple levels as discussed. There is patchy  marrow edema within L2 and L3 vertebral bodies which is likely to be  discogenic in etiology.    The study was interpreted at [facility].      Lacie Sarabia MD

## 2024-02-16 NOTE — ED TRIAGE NOTES
Pt is not talking. Pt is very weak. Hx of stroke with right sided deficits. Deficits had began to get better from therapy. Lkw yesterday. Brain attack called in triage due to right sided neglect and right sided vision changes. Pt is having difficulty speaking and is having dysarthia

## 2024-02-16 NOTE — ED PROVIDER NOTES
CC: Stroke     HPI:  Patient is a 78-year-old female with a past medical history of stroke with right-sided deficits on Eliquis who presented to the ED as a transfer from OSH for neurosurgical evaluation of intraventricular hemorrhage.  Patient is unable to state her name and knows that she needs to urinate.  Unable to obtain history from the patient.  Per EMS, patient noted to be completely altered from her baseline with family to be presenting shortly.  Per review of OSH ED note, patient initially noted to present with altered mental status including aphasia, worsening weakness of the right upper and lower extremity, and confusion.  Patient's last known well was noted to be last night.  The history of 5 by the patient's grandson.  Patient initially noted to be called as a bat at OSH.  CT head displayed intraventricular hemorrhage.  Patient noted to have a likely reactionary leukocytosis of 19.6.  Kcentra was given to reverse Eliquis.  Patient received rectal Tylenol for headache.  Discussion at OSH included conversation with the patient's daughter, Laura, who noted that she would not want any advanced life support measures for her mother including intubation or acute resuscitation.  Discussed patient presentation with her daughter who is in agreement with the patient's presentation.  She notes that the patient is DNR/DNI.  Unable to assess ROS from patient.    Limitations to history: Mental status  Independent historian(s): daughter (Laura)  Records Reviewed: Recent available ED and inpatient notes reviewed in EMR.    PMHx/PSHx:  Per HPI.   - has a past medical history of Acute upper respiratory infection, unspecified (02/27/2014), Anxiety disorder, unspecified (05/15/2017), Bee allergy status (05/15/2017), Body mass index (BMI) 29.0-29.9, adult (08/27/2019), Chronic obstructive pulmonary disease with (acute) exacerbation (CMS/[facility]) (02/27/2014), Chronic obstructive pulmonary disease, unspecified (CMS/[facility])  (06/09/2020), Chronic rhinitis (04/11/2017), Depression, unspecified (04/11/2017), Dry eye syndrome of right lacrimal gland (03/07/2017), Encounter for immunization (04/11/2017), Epistaxis, Hallux valgus (acquired), unspecified foot (04/11/2017), Impacted cerumen, left ear (04/11/2017), Lateral epicondylitis, right elbow (04/11/2017), Localized swelling, mass and lump, unspecified (04/04/2014), Osteoarthritis of knee, unspecified (04/11/2017), Other conditions influencing health status, Other conditions influencing health status (04/11/2017), Other conditions influencing health status (11/30/2013), Other conditions influencing health status (11/01/2013), Other hammer toe(s) (acquired), unspecified foot (04/11/2017), Other specified disorders of bone, unspecified site (04/11/2017), Other tear of medial meniscus, current injury, unspecified knee, initial encounter (02/14/2014), Pain in left knee (01/31/2014), Pain in leg, unspecified (02/19/2013), Pain in unspecified foot (04/11/2017), Pain in unspecified knee (08/05/2013), Paresthesia of skin (04/11/2017), Personal history of other diseases of the circulatory system, Personal history of other diseases of the circulatory system, Personal history of other diseases of the musculoskeletal system and connective tissue, Personal history of other diseases of the nervous system and sense organs, Personal history of other diseases of the respiratory system, Personal history of other diseases of the respiratory system (01/31/2014), Personal history of other diseases of the respiratory system (02/03/2015), Personal history of other endocrine, nutritional and metabolic disease, Personal history of other specified conditions, Postnasal drip (05/15/2017), Presence of intraocular lens (03/07/2017), Primary focal hyperhidrosis, 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  - has a past surgical history that includes Knee surgery (02/08/2017); Cataract extraction (02/08/2017); Other surgical history (03/07/2017); Bunionectomy (03/07/2017); Appendectomy (03/07/2017); BI stereotactic guided breast right localization and biopsy (Right, 6/4/2019); and MR angio head wo IV contrast (9/18/2023).    Medications:  Reviewed in EMR. See EMR for complete list of medications and doses.    Allergies:  Chlorzoxazone, Other, and Sulfa (sulfonamide antibiotics)    Social History:  - Tobacco:  reports that she quit smoking about 5 months ago. Her smoking use included cigarettes. She has never used smokeless tobacco.   - Alcohol:  reports no history of alcohol use.   - Illicit Drugs:  reports no history of drug use.     ROS:  Per HPI.       ???????????????????????????????????????????????????????????????  Triage Vitals:  T    HR 92  /71  RR 20  O2 (!) 93 %      Physical Exam    General: Confused and nonsensical speech.  Head: Normocephalic. Atraumatic.    Neck: No meningismus. No midline cervical spine tenderness with palpation. FROM. No gross masses.   Eyes: EOMI. No scleral icterus or injection.   ENT: Moist mucous membranes, no apparent trauma or lesions  CV: Regular rhythm. No murmurs, rubs, gallops appreciated. 2+ radial pulses bilaterally.  Resp: Clear to auscultation bilaterally. No respiratory distress.   GI: Soft, non-distended. No tenderness with palpation. No rebound tenderness or guarding. No palpable masses.  : No suprapubic or CVA tenderness.   MSK: No gross step offs or deformities.  EXT: No peripheral edema,  contusions, or wounds.   Skin: Warm and dry, no rashes or lesions.  Neuro: GCS 13 (E4 V3 M6). VAN positive. NIH 8 (2 points for month and age, 2 points for RUE motor, 2 points for RLE motor, and 2 points for aphasia)  Psych: Appropriate mood and behavior, converses and responds appropriately.    ???????????????????????????????????????????????????????????????  Labs:   Labs Reviewed   TYPE AND SCREEN   URINALYSIS WITH REFLEX CULTURE AND MICROSCOPIC    Narrative:     The following orders were created for panel order Urinalysis with Reflex Culture and Microscopic.  Procedure                               Abnormality         Status                     ---------                               -----------         ------                     Urinalysis with Reflex C...[[id number]]                                                 Extra Urine Gray Tube[[id number]]                                                         Please view results for these tests on the individual orders.   URINALYSIS WITH REFLEX CULTURE AND MICROSCOPIC   EXTRA URINE GRAY TUBE        Imaging:   CT head wo IV contrast    (Results Pending)        MDM:  Patient is a 78-year-old female with a past medical history of stroke with right-sided deficits on Eliquis who presented to the ED as a transfer from OSH for neurosurgical evaluation of intraventricular hemorrhage.  Patient is HDS.  Physical exam findings significant for an altered 78-year-old female.  Concern for possible traumatic versus spontaneous intraventricular hemorrhage.  Basic labs and imaging labs and imaging from OSH reviewed.  Preop type and screen ordered.  UA did not display findings for UTI.  Repeat CT head for 6 hours after initial ordered.  Neurosurgery was consulted.  Neurosurgery evaluated the patient and discussed intervention versus comfort measures.  Patient's daughter Laura noted that the patient would not want any further procedures. Family noted that the patient would not like a  procedure as well as being in the ICU for multiple days to weeks.  Family was noted request resources for hospice/palliative.  Upon my discussion with family, they reiterated that the patient is DNR/DNI and they would not like to pursue any procedure or interventions.  They noted that they would not want ICU for elevation of care.  They do not want the patient to be evaluated hourly.  They noted that they would prefer admission for care of her mother with such a significant change in mental status as well as further evaluating next steps for the next phase of care for their mother.  CT head canceled.  DNR/DNI order in place.  Discussed patient presentation with admitting team.  Patient admitted to medicine in stable condition.    ED Course:  Diagnoses as of 02/18/24 1016   Intraventricular hemorrhage (CMS/HCC)       Social Determinants Limiting Care:  None identified    Disposition:  Admission    Phill Ponce MD   Emergency Medicine PGY-2  [facility]    Comment: Please note this report has been produced using speech recognition software and may contain errors related to that system including errors in grammar, punctuation, and spelling as well as words and phrases that may be inappropriate.  If there are any questions or concerns please feel free to contact the dictating provider for clarification.    Procedures ? SmartLinks last updated 2/16/2024 6:15 PM        Phill Ponce MD  Resident  02/18/24 [id number]     home

## 2024-02-17 VITALS
BODY MASS INDEX: 24.8 KG/M2 | WEIGHT: 145.28 LBS | HEIGHT: 64 IN | OXYGEN SATURATION: 95 % | TEMPERATURE: 99 F | DIASTOLIC BLOOD PRESSURE: 75 MMHG | RESPIRATION RATE: 16 BRPM | SYSTOLIC BLOOD PRESSURE: 135 MMHG | HEART RATE: 101 BPM

## 2024-02-17 LAB
ATRIAL RATE: 82 BPM
HOLD SPECIMEN: NORMAL
P AXIS: 30 DEGREES
P OFFSET: 192 MS
P ONSET: 136 MS
PR INTERVAL: 172 MS
Q ONSET: 222 MS
QRS COUNT: 14 BEATS
QRS DURATION: 80 MS
QT INTERVAL: 396 MS
QTC CALCULATION(BAZETT): 462 MS
QTC FREDERICIA: 439 MS
R AXIS: 74 DEGREES
T AXIS: 52 DEGREES
T OFFSET: 420 MS
VENTRICULAR RATE: 82 BPM

## 2024-02-17 PROCEDURE — 99221 1ST HOSP IP/OBS SF/LOW 40: CPT | Performed by: STUDENT IN AN ORGANIZED HEALTH CARE EDUCATION/TRAINING PROGRAM

## 2024-02-17 PROCEDURE — 2500000001 HC RX 250 WO HCPCS SELF ADMINISTERED DRUGS (ALT 637 FOR MEDICARE OP): Performed by: STUDENT IN AN ORGANIZED HEALTH CARE EDUCATION/TRAINING PROGRAM

## 2024-02-17 PROCEDURE — 2500000004 HC RX 250 GENERAL PHARMACY W/ HCPCS (ALT 636 FOR OP/ED): Performed by: STUDENT IN AN ORGANIZED HEALTH CARE EDUCATION/TRAINING PROGRAM

## 2024-02-17 PROCEDURE — 2500000002 HC RX 250 W HCPCS SELF ADMINISTERED DRUGS (ALT 637 FOR MEDICARE OP, ALT 636 FOR OP/ED): Performed by: STUDENT IN AN ORGANIZED HEALTH CARE EDUCATION/TRAINING PROGRAM

## 2024-02-17 RX ORDER — METOPROLOL SUCCINATE 25 MG/1
25 TABLET, EXTENDED RELEASE ORAL DAILY
Status: DISCONTINUED | OUTPATIENT
Start: 2024-02-17 | End: 2024-02-17

## 2024-02-17 RX ORDER — ISOSORBIDE MONONITRATE 30 MG/1
30 TABLET, EXTENDED RELEASE ORAL DAILY
Status: DISCONTINUED | OUTPATIENT
Start: 2024-02-17 | End: 2024-02-17

## 2024-02-17 RX ORDER — LORAZEPAM 2 MG/ML
0.5 INJECTION INTRAMUSCULAR EVERY 4 HOURS PRN
Status: DISCONTINUED | OUTPATIENT
Start: 2024-02-17 | End: 2024-02-17 | Stop reason: HOSPADM

## 2024-02-17 RX ORDER — HALOPERIDOL 5 MG/ML
1 INJECTION INTRAMUSCULAR EVERY 4 HOURS PRN
Status: DISCONTINUED | OUTPATIENT
Start: 2024-02-17 | End: 2024-02-17 | Stop reason: HOSPADM

## 2024-02-17 RX ORDER — HALOPERIDOL 2 MG/ML
0.5 SOLUTION ORAL EVERY 4 HOURS PRN
Status: DISCONTINUED | OUTPATIENT
Start: 2024-02-17 | End: 2024-02-17 | Stop reason: HOSPADM

## 2024-02-17 RX ORDER — MELOXICAM 15 MG/1
15 TABLET ORAL 3 TIMES WEEKLY
Status: DISCONTINUED | OUTPATIENT
Start: 2024-02-19 | End: 2024-02-17

## 2024-02-17 RX ORDER — LOSARTAN POTASSIUM 100 MG/1
100 TABLET ORAL DAILY
Status: DISCONTINUED | OUTPATIENT
Start: 2024-02-17 | End: 2024-02-17

## 2024-02-17 RX ORDER — SCOLOPAMINE TRANSDERMAL SYSTEM 1 MG/1
1 PATCH, EXTENDED RELEASE TRANSDERMAL
Status: DISCONTINUED | OUTPATIENT
Start: 2024-02-17 | End: 2024-02-17 | Stop reason: HOSPADM

## 2024-02-17 RX ORDER — INSULIN LISPRO 100 [IU]/ML
0-10 INJECTION, SOLUTION INTRAVENOUS; SUBCUTANEOUS
Status: DISCONTINUED | OUTPATIENT
Start: 2024-02-17 | End: 2024-02-17

## 2024-02-17 RX ORDER — DEXTROSE 50 % IN WATER (D50W) INTRAVENOUS SYRINGE
25
Status: DISCONTINUED | OUTPATIENT
Start: 2024-02-17 | End: 2024-02-17 | Stop reason: HOSPADM

## 2024-02-17 RX ORDER — GLYCOPYRROLATE 0.2 MG/ML
0.2 INJECTION INTRAMUSCULAR; INTRAVENOUS EVERY 4 HOURS PRN
Status: DISCONTINUED | OUTPATIENT
Start: 2024-02-17 | End: 2024-02-17 | Stop reason: HOSPADM

## 2024-02-17 RX ORDER — MORPHINE SULFATE 2 MG/ML
2 INJECTION, SOLUTION INTRAMUSCULAR; INTRAVENOUS EVERY 2 HOUR PRN
Status: DISCONTINUED | OUTPATIENT
Start: 2024-02-17 | End: 2024-02-17 | Stop reason: HOSPADM

## 2024-02-17 RX ORDER — LATANOPROST 50 UG/ML
1 SOLUTION/ DROPS OPHTHALMIC DAILY
Status: DISCONTINUED | OUTPATIENT
Start: 2024-02-17 | End: 2024-02-17

## 2024-02-17 RX ORDER — GABAPENTIN 300 MG/1
300 CAPSULE ORAL DAILY
Status: DISCONTINUED | OUTPATIENT
Start: 2024-02-17 | End: 2024-02-17

## 2024-02-17 RX ORDER — ALBUTEROL SULFATE 5 MG/ML
2.5 SOLUTION RESPIRATORY (INHALATION) EVERY 6 HOURS PRN
Status: DISCONTINUED | OUTPATIENT
Start: 2024-02-17 | End: 2024-02-17 | Stop reason: HOSPADM

## 2024-02-17 RX ORDER — EZETIMIBE 10 MG/1
10 TABLET ORAL DAILY
Status: DISCONTINUED | OUTPATIENT
Start: 2024-02-17 | End: 2024-02-17

## 2024-02-17 RX ORDER — ATORVASTATIN CALCIUM 40 MG/1
40 TABLET, FILM COATED ORAL DAILY
Status: DISCONTINUED | OUTPATIENT
Start: 2024-02-17 | End: 2024-02-17

## 2024-02-17 RX ORDER — DEXTROSE MONOHYDRATE 100 MG/ML
0.3 INJECTION, SOLUTION INTRAVENOUS ONCE AS NEEDED
Status: DISCONTINUED | OUTPATIENT
Start: 2024-02-17 | End: 2024-02-17 | Stop reason: HOSPADM

## 2024-02-17 RX ORDER — KETOROLAC TROMETHAMINE 15 MG/ML
15 INJECTION, SOLUTION INTRAMUSCULAR; INTRAVENOUS EVERY 6 HOURS PRN
Status: DISCONTINUED | OUTPATIENT
Start: 2024-02-17 | End: 2024-02-17 | Stop reason: HOSPADM

## 2024-02-17 RX ORDER — ALPRAZOLAM 0.25 MG/1
0.25 TABLET ORAL 2 TIMES DAILY
Status: DISCONTINUED | OUTPATIENT
Start: 2024-02-17 | End: 2024-02-17

## 2024-02-17 RX ORDER — NYSTATIN 100000 [USP'U]/ML
10 SUSPENSION ORAL DAILY
Status: DISCONTINUED | OUTPATIENT
Start: 2024-02-17 | End: 2024-02-17 | Stop reason: HOSPADM

## 2024-02-17 RX ORDER — AMLODIPINE BESYLATE 10 MG/1
10 TABLET ORAL DAILY
Status: DISCONTINUED | OUTPATIENT
Start: 2024-02-17 | End: 2024-02-17

## 2024-02-17 RX ADMIN — MORPHINE SULFATE 2 MG: 2 INJECTION, SOLUTION INTRAMUSCULAR; INTRAVENOUS at 21:10

## 2024-02-17 RX ADMIN — KETOROLAC TROMETHAMINE 15 MG: 15 INJECTION, SOLUTION INTRAMUSCULAR; INTRAVENOUS at 13:00

## 2024-02-17 RX ADMIN — SCOPOLAMINE 1 PATCH: 1.5 PATCH, EXTENDED RELEASE TRANSDERMAL at 13:00

## 2024-02-17 RX ADMIN — MORPHINE SULFATE 2 MG: 2 INJECTION, SOLUTION INTRAMUSCULAR; INTRAVENOUS at 18:51

## 2024-02-17 SDOH — SOCIAL STABILITY: SOCIAL INSECURITY: ABUSE: ADULT

## 2024-02-17 SDOH — SOCIAL STABILITY: SOCIAL INSECURITY: HAVE YOU HAD THOUGHTS OF HARMING ANYONE ELSE?: NO

## 2024-02-17 SDOH — SOCIAL STABILITY: SOCIAL INSECURITY: HAS ANYONE EVER THREATENED TO HURT YOUR FAMILY OR YOUR PETS?: NO

## 2024-02-17 SDOH — SOCIAL STABILITY: SOCIAL INSECURITY: WERE YOU ABLE TO COMPLETE ALL THE BEHAVIORAL HEALTH SCREENINGS?: NO

## 2024-02-17 SDOH — SOCIAL STABILITY: SOCIAL INSECURITY: DO YOU FEEL ANYONE HAS EXPLOITED OR TAKEN ADVANTAGE OF YOU FINANCIALLY OR OF YOUR PERSONAL PROPERTY?: NO

## 2024-02-17 SDOH — SOCIAL STABILITY: SOCIAL INSECURITY: DO YOU FEEL UNSAFE GOING BACK TO THE PLACE WHERE YOU ARE LIVING?: NO

## 2024-02-17 SDOH — SOCIAL STABILITY: SOCIAL INSECURITY: ARE THERE ANY APPARENT SIGNS OF INJURIES/BEHAVIORS THAT COULD BE RELATED TO ABUSE/NEGLECT?: NO

## 2024-02-17 SDOH — SOCIAL STABILITY: SOCIAL INSECURITY: ARE YOU OR HAVE YOU BEEN THREATENED OR ABUSED PHYSICALLY, EMOTIONALLY, OR SEXUALLY BY ANYONE?: NO

## 2024-02-17 SDOH — SOCIAL STABILITY: SOCIAL INSECURITY: DOES ANYONE TRY TO KEEP YOU FROM HAVING/CONTACTING OTHER FRIENDS OR DOING THINGS OUTSIDE YOUR HOME?: NO

## 2024-02-17 ASSESSMENT — PAIN - FUNCTIONAL ASSESSMENT
PAIN_FUNCTIONAL_ASSESSMENT: 0-10
PAIN_FUNCTIONAL_ASSESSMENT: 0-10

## 2024-02-17 ASSESSMENT — COGNITIVE AND FUNCTIONAL STATUS - GENERAL
TOILETING: A LOT
EATING MEALS: A LOT
MOVING TO AND FROM BED TO CHAIR: A LOT
EATING MEALS: A LOT
HELP NEEDED FOR BATHING: A LOT
MOBILITY SCORE: 12
MOVING FROM LYING ON BACK TO SITTING ON SIDE OF FLAT BED WITH BEDRAILS: A LOT
TOILETING: A LOT
STANDING UP FROM CHAIR USING ARMS: A LOT
DRESSING REGULAR UPPER BODY CLOTHING: A LOT
PATIENT BASELINE BEDBOUND: NO
DAILY ACTIVITIY SCORE: 12
DRESSING REGULAR UPPER BODY CLOTHING: A LOT
STANDING UP FROM CHAIR USING ARMS: A LOT
MOVING TO AND FROM BED TO CHAIR: A LOT
DAILY ACTIVITIY SCORE: 12
MOBILITY SCORE: 12
PERSONAL GROOMING: A LOT
TURNING FROM BACK TO SIDE WHILE IN FLAT BAD: A LOT
HELP NEEDED FOR BATHING: A LOT
PERSONAL GROOMING: A LOT
TURNING FROM BACK TO SIDE WHILE IN FLAT BAD: A LOT
DRESSING REGULAR LOWER BODY CLOTHING: A LOT
MOVING FROM LYING ON BACK TO SITTING ON SIDE OF FLAT BED WITH BEDRAILS: A LOT
CLIMB 3 TO 5 STEPS WITH RAILING: A LOT
CLIMB 3 TO 5 STEPS WITH RAILING: A LOT
WALKING IN HOSPITAL ROOM: A LOT
DRESSING REGULAR LOWER BODY CLOTHING: A LOT
WALKING IN HOSPITAL ROOM: A LOT

## 2024-02-17 ASSESSMENT — LIFESTYLE VARIABLES
HOW OFTEN DO YOU HAVE 6 OR MORE DRINKS ON ONE OCCASION: NEVER
AUDIT-C TOTAL SCORE: 0
AUDIT-C TOTAL SCORE: 0
SKIP TO QUESTIONS 9-10: 1
HOW OFTEN DO YOU HAVE A DRINK CONTAINING ALCOHOL: NEVER
HOW MANY STANDARD DRINKS CONTAINING ALCOHOL DO YOU HAVE ON A TYPICAL DAY: PATIENT DOES NOT DRINK

## 2024-02-17 ASSESSMENT — PATIENT HEALTH QUESTIONNAIRE - PHQ9
SUM OF ALL RESPONSES TO PHQ9 QUESTIONS 1 & 2: 0
1. LITTLE INTEREST OR PLEASURE IN DOING THINGS: NOT AT ALL
2. FEELING DOWN, DEPRESSED OR HOPELESS: NOT AT ALL

## 2024-02-17 ASSESSMENT — ACTIVITIES OF DAILY LIVING (ADL)
FEEDING YOURSELF: NEEDS ASSISTANCE
ASSISTIVE_DEVICE: WALKER
DRESSING YOURSELF: NEEDS ASSISTANCE
ADEQUATE_TO_COMPLETE_ADL: YES
JUDGMENT_ADEQUATE_SAFELY_COMPLETE_DAILY_ACTIVITIES: NO
GROOMING: NEEDS ASSISTANCE
BATHING: NEEDS ASSISTANCE
TOILETING: NEEDS ASSISTANCE
HEARING - RIGHT EAR: FUNCTIONAL
WALKS IN HOME: NEEDS ASSISTANCE
PATIENT'S MEMORY ADEQUATE TO SAFELY COMPLETE DAILY ACTIVITIES?: NO
LACK_OF_TRANSPORTATION: NO
HEARING - LEFT EAR: FUNCTIONAL

## 2024-02-17 ASSESSMENT — COLUMBIA-SUICIDE SEVERITY RATING SCALE - C-SSRS
1. IN THE PAST MONTH, HAVE YOU WISHED YOU WERE DEAD OR WISHED YOU COULD GO TO SLEEP AND NOT WAKE UP?: NO
6. HAVE YOU EVER DONE ANYTHING, STARTED TO DO ANYTHING, OR PREPARED TO DO ANYTHING TO END YOUR LIFE?: NO
2. HAVE YOU ACTUALLY HAD ANY THOUGHTS OF KILLING YOURSELF?: NO

## 2024-02-17 ASSESSMENT — PAIN SCALES - GENERAL
PAINLEVEL_OUTOF10: 0 - NO PAIN
PAINLEVEL_OUTOF10: 0 - NO PAIN

## 2024-02-17 NOTE — DISCHARGE SUMMARY
"                                                   INTERNAL MEDICINE DISCHARGE SUMMARY             Discharge Diagnosis   Intraventricular hemorrhage (CMS/HCC)    Issues Requiring Follow-Up   None    Test Results Pending At Discharge     Pending Labs       No current pending labs.          Hospital Course   78-year-old female with a past medical history of stroke with right-sided deficit on Eliquis presenting to [facility] emergency department with a chief complaint of aphasia, worsening weakness of right upper and lower extremity, confusion. Her last known well was the night of 2/15. Pt daughter who's the POA was at bedside. CT head showed a large left-sided intraparenchymal hemorrhage however no signs of trauma. Neurosurgery accepted to [facility]. On arrival to the ED, NSGY evaluated paitient; family expressed patient's wishes for no invasive procedures. Patient was admitted for Hospice placement.   I discussed in detail with family regarding prognosis and patient wishes and decision was made to transfer patient to hospice/comfort care only        Pertinent Physical Exam At Time of Discharge     Physical Exam  AO x2, in mild distress  No JVD, supple neck  CTAB, no crackles/rales/wheezing  S1/S2 wnl, no mrg  Soft, nd, nt, +ve bs, no organomegaly  Warm and Dry extremities, unable to coperate for complete rizwana    Home Medications        Medication List      CONTINUE taking these medications     Sure Comfort Pen Needle 32 gauge x 5/32\" needle; Generic drug: pen   needle, diabetic     STOP taking these medications     albuterol 5 mg/mL nebulizer solution   amLODIPine 10 mg tablet; Commonly known as: Norvasc   apixaban 5 mg tablet; Commonly known as: Eliquis   atorvastatin 40 mg tablet; Commonly known as: Lipitor   Breztri Aerosphere 160-9-4.8 mcg/actuation HFA aerosol inhaler; Generic   drug: budesonide-glycopyr-formoterol   ezetimibe 10 mg tablet; Commonly known as: Zetia   gabapentin 300 mg capsule; Commonly known as: " Neurontin   HumaLOG KwikPen Insulin 100 unit/mL injection; Generic drug: insulin   lispro   isosorbide mononitrate ER 30 mg 24 hr tablet; Commonly known as: Imdur   latanoprost 0.005 % ophthalmic solution; Commonly known as: Xalatan   losartan 100 mg tablet; Commonly known as: Cozaar   meloxicam 15 mg tablet; Commonly known as: Mobic   metFORMIN 500 mg tablet; Commonly known as: Glucophage   metoprolol succinate XL 25 mg 24 hr tablet; Commonly known as: Toprol-XL   omeprazole 40 mg DR capsule; Commonly known as: PriLOSEC   Tresiba FlexTouch U-100 100 unit/mL (3 mL) injection; Generic drug:   insulin degludec     Outpatient Follow-Up     Future Appointments   Date Time Provider Department [place]   3/4/2024 11:30 AM LAZARO IngramCNP [id number] [facility]   6/21/2024 10:00 AM RENAE Vargas [id number] [facility]   7/18/2024  1:00 PM Iqra Valladares MD [id number] [facility]       Silvestre Stewart MD

## 2024-02-17 NOTE — CARE PLAN
The patient's goals for the shift include      The clinical goals for the shift include Patient will remain comfortable during shift      Problem: Diabetes  Goal: Achieve decreasing blood glucose levels by end of shift  Outcome: Progressing  Goal: Increase stability of blood glucose readings by end of shift  Outcome: Progressing  Goal: Decrease in ketones present in urine by end of shift  Outcome: Progressing  Goal: Maintain electrolyte levels within acceptable range throughout shift  Outcome: Progressing  Goal: Maintain glucose levels >70mg/dl to <250mg/dl throughout shift  Outcome: Progressing  Goal: No changes in neurological exam by end of shift  Outcome: Progressing  Goal: Learn about and adhere to nutrition recommendations by end of shift  Outcome: Progressing  Goal: Vital signs within normal range for age by end of shift  Outcome: Progressing  Goal: Increase self care and/or family involovement by end of shift  Outcome: Progressing  Goal: Receive DSME education by end of shift  Outcome: Progressing     Problem: Pain  Goal: My pain/discomfort is manageable  Outcome: Progressing     Problem: Safety  Goal: Patient will be injury free during hospitalization  Outcome: Progressing  Goal: I will remain free of falls  Outcome: Progressing     Problem: Daily Care  Goal: Daily care needs are met  Outcome: Progressing     Problem: Daily Care  Goal: Daily care needs are met  Outcome: Progressing     Problem: Discharge Barriers  Goal: My discharge needs are met  Outcome: Progressing     Problem: Skin  Goal: Decreased wound size/increased tissue granulation at next dressing change  Outcome: Progressing  Goal: Participates in plan/prevention/treatment measures  Outcome: Progressing  Goal: Prevent/manage excess moisture  Outcome: Progressing  Goal: Prevent/minimize sheer/friction injuries  Outcome: Progressing  Goal: Promote/optimize nutrition  Outcome: Progressing  Goal: Promote skin healing  Outcome: Progressing

## 2024-02-17 NOTE — NURSING NOTE
[facility]: I met with pt's dtr Annamarie and son in law Michael at bedside. Hospice services and medicare benefit reviewed. Annamarie agreed with hospice admission and plan for transfer to [facility]. Pt will be picked up at 9p via [facility] ambulance. Dr Stewart and Loren MODI aware of plan. Thank you for this referral. [phone]

## 2024-02-17 NOTE — PROGRESS NOTES
"C: for Hospice. Contacted dtr Annamarie Newman [phone] (Mobile) to confirm plan for hospice. Agreeable for [facility] & next steps explained.  Referral placed & awaiting reply from [facility] in  as to when they made contact with dtr and for what dte/time meeting to occur. [id number] [id number] In CP, [facility] replied, \"Spoke with dtr Annamarie confirmed appt on today at 3-3:30pm there at  in pt room.\" Team notified.     Marcelle Verma (LSW, MSW)         "

## 2024-02-17 NOTE — PROGRESS NOTES
"Pharmacy Medication History Review    Malou Junior is a 78 y.o. female admitted for Intraventricular hemorrhage (CMS/[facility]). Pharmacy reviewed the patient's [id number]-yt-[id number] medications and allergies for accuracy.    The list below reflects the updated PTA list. Comments regarding how patient may be taking medications differently can be found in the Admit Orders Activity  Prior to Admission Medications   Prescriptions Last Dose Informant   Breztri Aerosphere 160-9-4.8 mcg/actuation HFA aerosol inhaler     Sig: Inhale 2 puffs 2 times a day.   Sure Comfort Pen Needle 32 gauge x \" needle     Tresiba FlexTouch U-100 100 unit/mL (3 mL) injection     Si UNITS DAILY DISP 30 ML 90 DAY SUPPLY REFILL 3   Patient taking differently: Inject 27 Units under the skin once daily in the morning. 30 UNITS DAILY DISP 30 ML 90 DAY SUPPLY REFILL 3   albuterol 5 mg/mL nebulizer solution     Sig: Take 0.5 mL (2.5 mg) by nebulization every 6 hours if needed for wheezing.   amLODIPine (Norvasc) 10 mg tablet     Sig: Take 1 tablet (10 mg) by mouth once daily.   apixaban (Eliquis) 5 mg tablet     Sig: Take 1 tablet (5 mg) by mouth 2 times a day.   atorvastatin (Lipitor) 40 mg tablet     Sig: Take 1 tablet (40 mg) by mouth once daily.   ezetimibe (Zetia) 10 mg tablet     Sig: Take 1 tablet (10 mg) by mouth once daily.   gabapentin (Neurontin) 300 mg capsule     Sig: Take 1 capsule (300 mg) by mouth once daily.   insulin lispro (HumaLOG) 100 unit/mL injection     Sig: INJECT AS DIRECTED UNDER THE SKIN BEFORE MEALS UP TO 60 UNITS DAILY   isosorbide mononitrate ER (Imdur) 30 mg 24 hr tablet     Sig: Take 1 tablet (30 mg) by mouth once daily. Do not crush or chew.   latanoprost (Xalatan) 0.005 % ophthalmic solution     Sig: Administer 1 drop into both eyes once daily.   losartan (Cozaar) 100 mg tablet     Sig: Take 1 tablet (100 mg) by mouth once daily.   meloxicam (Mobic) 15 mg tablet     Sig: Take 1 tablet (15 mg) by mouth 3 (three) " "times a week.   metFORMIN (Glucophage) 500 mg tablet     Sig: Take 1 tablet (500 mg) by mouth 2 times a day with meals.   metoprolol succinate XL (Toprol-XL) 25 mg 24 hr tablet     Sig: Take 1 tablet (25 mg) by mouth once daily.   omeprazole (PriLOSEC) 40 mg DR capsule     Sig: Take 1 capsule (40 mg) by mouth. Do not crush or chew.      Facility-Administered Medications: None       The list below reflects the updated allergy list. Please review each documented allergy for additional clarification and justification.  Allergies  Indicated as Unable to Assess by Miko Smith RN on 2/16/2024 (Patient unable to communicate)        Severity Reactions Comments    Chlorzoxazone Not Specified Unknown     Other Not Specified Other Ic Chlorzoxaz - \"stomach upset\"    Sulfa (sulfonamide Antibiotics) Not Specified Unknown             Patient declines M2B at discharge.     Sources used to complete the med history include out patient fill history, OARRS, and patient interview - conducted with patient's daughter at bedside    Below are additional concerns with the patient's PTA list.  N/A    Santosh Prajapati, PharmD  Transitions of Care Pharmacist  [facility]  Please reach out via Secure Chat for questions, or if no response call [email or web address] or [email or web address] [facility]   "

## 2024-02-17 NOTE — CARE PLAN
Problem: Discharge Barriers  Goal: My discharge needs are met  2/17/2024 1617 by Loren Villafana RN  Outcome: Progressing  2/17/2024 1615 by Loren Villafana RN  Outcome: Progressing     Problem: Skin  Goal: Decreased wound size/increased tissue granulation at next dressing change  2/17/2024 1617 by Loren Villafana RN  Outcome: Progressing  2/17/2024 1615 by Loren Villafana RN  Outcome: Progressing  Goal: Participates in plan/prevention/treatment measures  2/17/2024 1617 by Loren Villafana RN  Outcome: Progressing  2/17/2024 1615 by Loren Villafana RN  Outcome: Progressing  Goal: Prevent/manage excess moisture  2/17/2024 1617 by Loren Villafana RN  Outcome: Progressing  2/17/2024 1615 by Loren Villafana RN  Outcome: Progressing  Goal: Prevent/minimize sheer/friction injuries  2/17/2024 1617 by Loren Villafana RN  Outcome: Progressing  2/17/2024 1615 by Loren Villafana RN  Outcome: Progressing  Goal: Promote/optimize nutrition  2/17/2024 1617 by Loren Villafana RN  Outcome: Progressing  2/17/2024 1615 by Loren Villafana RN  Outcome: Progressing  Goal: Promote skin healing  2/17/2024 1617 by Loren Villafana RN  Outcome: Progressing  2/17/2024 1615 by Loren Villafana RN  Outcome: Progressing   The patient's goals for the shift include      The clinical goals for the shift include pt will remain injuy free

## 2024-02-17 NOTE — PROGRESS NOTES
"Malou Junior is a 78 y.o. female on day 1 of admission presenting with Intraventricular hemorrhage (CMS/HCC).  Attempt made to meet patient for discharge planning.  Family at the bedside requesting comfort care.  Message sent to social work.        Physical Exam    Last Recorded Vitals  Blood pressure 135/75, pulse 88, temperature 36.1 °C (97 °F), resp. rate 16, height 1.626 m (5' 4\"), weight 65.9 kg (145 lb 4.5 oz), SpO2 94 %.  Intake/Output last 3 Shifts:  No intake/output data recorded.        Assessment/Plan   Principal Problem:    Intraventricular hemorrhage (CMS/HCC)            Wei Garay RN      "

## 2024-02-17 NOTE — H&P
"History Of Present Illness:    78-year-old female with a past medical history of stroke with right-sided deficit on Eliquis presenting to [facility] emergency department with a chief complaint of aphasia, worsening weakness of right upper and lower extremity, confusion. Her last known well was the night of 2/15. The history is provided by her grandson. She was attempting to answer but had difficulty with word finding and speech is nonsensical. CT head showed a large left-sided intraparenchymal hemorrhage however no signs of trauma. Neurosurgery accepted to [facility] [place]. On arrival to the ED, NSGY evaluated paitient; family expressed patient's wishes for no invasive procedures. Patient was admitted for Hospice placement.     I saw the patient and her daughter at the ED. After talking to the NSGY resident I made sure to re-iterate the prognosis and possible scenarios of each approach (invasive vs comfort). Daughter reported that her mother clearly reported to her and her siblings that she would not like any other procedures and hospitalizations in the future in case of a medical emergency.     Patient to be admitted for Hospice evaluation.     Last Recorded Vitals:  Vitals:    02/16/24 1845 02/16/24 1857 02/16/24 1942 02/16/24 2041   BP:   147/71 152/69   BP Location:   Right arm Left arm   Patient Position:   Lying Lying   Pulse:   87 87   Resp:   16 16   Temp:  37.1 °C (98.8 °F)     TempSrc:  Oral     SpO2: (!) 92%   (!) 93%   Weight:  65.9 kg (145 lb 4.5 oz)     Height:  1.626 m (5' 4\")         Last Labs:  CBC - 2/16/2024:  4:18 PM  19.6 12.2 359    37.3      CMP - 2/16/2024:  4:18 PM  9.8 7.6 24 --- 0.4   _ 4.4 22 65      PTT - 9/17/2023:  3:20 PM  1.1   11.1 32.1     POC HEMOGLOBIN A1c   Date/Time Value Ref Range Status   01/31/2024 10:10 AM 6.8 (A) 4.2 - 6.5 % Final     Hemoglobin A1C   Date/Time Value Ref Range Status   09/25/2023 05:08 AM 7.1 (H) 4.3 - 5.6 % Final     Comment:     American Diabetes Association " guidelines indicate that patients with HgbA1c in the range 5.7-6.4% are at increased risk for development of diabetes, and intervention by lifestyle modification may be beneficial. HgbA1c greater or equal to 6.5% is   considered diagnostic of diabetes.   09/18/2023 04:29 AM 6.7 (H) 4.0 - 6.0 % Final     Comment:     Hemoglobin A1C levels are related to mean blood glucose during the   preceding 2-3 months. The relationship table below may be used as a   general guide. Each 1% increase in HGB A1C is a reflection of an   increase in mean glucose of approximately 30 mg/dl.   Reference: Diabetes Care, volume 29, supplement 1 Jan. 2006                        HGB A1C ................. Approx. Mean Glucose   _______________________________________________   6%   ...............................  120 mg/dl   7%   ...............................  150 mg/dl   8%   ...............................  180 mg/dl   9%   ...............................  210 mg/dl   10%  ...............................  240 mg/dl  Performed at [address] [address]       LDL Calculated   Date/Time Value Ref Range Status   02/05/2024 10:02 AM 78 <=99 mg/dL Final     Comment:                                 Near   Borderline      AGE      Desirable  Optimal    High     High     Very High     0-19 Y     0 - 109     ---    110-129   >/= 130     ----    20-24 Y     0 - 119     ---    120-159   >/= 160     ----      >24 Y     0 -  99   100-129  130-159   160-189     >/=190     09/18/2023 04:29 AM 47 (L) 65 - 130 MG/DL Final     VLDL   Date/Time Value Ref Range Status   02/05/2024 10:02 AM 27 0 - 40 mg/dL Final   09/11/2023 10:52 AM 21 0 - 40 mg/dL Final   07/10/2023 10:31 AM 25 0 - 40 mg/dL Final      Last I/O:  No intake/output data recorded.    Past Cardiology Tests (Last 3 Years):  EKG:  ECG 12 lead (Clinic Performed) 11/27/2023    Echo:  No results found for this or any previous visit from the past 1095 days.    Ejection  "Fractions:  No results found for: \"EF\"  Cath:  No results found for this or any previous visit from the past 1095 days.    Stress Test:  No results found for this or any previous visit from the past 1095 days.    Cardiac Imaging:  No results found for this or any previous visit from the past 1095 days.      Past Medical History:  She has a past medical history of Acute upper respiratory infection, unspecified (02/27/2014), Anxiety disorder, unspecified (05/15/2017), Bee allergy status (05/15/2017), Body mass index (BMI) 29.0-29.9, adult (08/27/2019), Chronic obstructive pulmonary disease with (acute) exacerbation (CMS/[facility]) (02/27/2014), Chronic obstructive pulmonary disease, unspecified (CMS/[facility]) (06/09/2020), Chronic rhinitis (04/11/2017), Depression, unspecified (04/11/2017), Dry eye syndrome of right lacrimal gland (03/07/2017), Encounter for immunization (04/11/2017), Epistaxis, Hallux valgus (acquired), unspecified foot (04/11/2017), Impacted cerumen, left ear (04/11/2017), Lateral epicondylitis, right elbow (04/11/2017), Localized swelling, mass and lump, unspecified (04/04/2014), Osteoarthritis of knee, unspecified (04/11/2017), Other conditions influencing health status, Other conditions influencing health status (04/11/2017), Other conditions influencing health status (11/30/2013), Other conditions influencing health status (11/01/2013), Other hammer toe(s) (acquired), unspecified foot (04/11/2017), Other specified disorders of bone, unspecified site (04/11/2017), Other tear of medial meniscus, current injury, unspecified knee, initial encounter (02/14/2014), Pain in left knee (01/31/2014), Pain in leg, unspecified (02/19/2013), Pain in unspecified foot (04/11/2017), Pain in unspecified knee (08/05/2013), Paresthesia of skin (04/11/2017), Personal history of other diseases of the circulatory system, Personal history of other diseases of the circulatory system, Personal history of other diseases of the " musculoskeletal system and connective tissue, Personal history of other diseases of the nervous system and sense organs, Personal history of other diseases of the respiratory system, Personal history of other diseases of the respiratory system (01/31/2014), Personal history of other diseases of the respiratory system (02/03/2015), Personal history of other endocrine, nutritional and metabolic disease, Personal history of other specified conditions, Postnasal drip (05/15/2017), Presence of intraocular lens (03/07/2017), Primary focal hyperhidrosis, 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    Past Surgical History:  She has a past surgical history that includes Knee surgery (02/08/2017); Cataract extraction (02/08/2017); Other surgical history (03/07/2017); Bunionectomy (03/07/2017); Appendectomy (03/07/2017); BI stereotactic guided breast right localization and biopsy (Right, 6/4/2019); and MR angio head wo IV contrast (9/18/2023).      Social History:  She reports that she quit smoking about 5 months ago. Her smoking use included cigarettes. She has never used smokeless tobacco. She reports that she does not drink alcohol and does not use drugs.    Family History:  Family History   Problem Relation Name Age of Onset    Mental illness Sister          passed from mental illness        Allergies:  Chlorzoxazone, Other, and Sulfa (sulfonamide antibiotics)    Inpatient Medications:        Outpatient Medications:  Current Outpatient Medications  "  Medication Instructions    albuterol 2.5 mg, nebulization, Every 6 hours PRN    ALPRAZolam (XANAX) 0.25 mg, oral, 2 times daily    amLODIPine (NORVASC) 10 mg, oral, Daily    apixaban (ELIQUIS) 5 mg, oral, 2 times daily    atorvastatin (LIPITOR) 40 mg, oral, Daily    Breztri Aerosphere 160-9-4.8 mcg/actuation HFA aerosol inhaler 2 puffs, inhalation, 2 times daily RT    ezetimibe (ZETIA) 10 mg, oral, Daily    gabapentin (NEURONTIN) 300 mg, oral, Daily    HumaLOG KwikPen Insulin 60 Units, subcutaneous, Daily    insulin glargine (Lantus U-100 Insulin) 100 unit/mL injection Lantus 100 UNIT/ML Subcutaneous Solution   Refills: 0        Start : 16-Mar-2021   Active  10 ML Vial    insulin lispro (HumaLOG) 100 unit/mL injection INJECT AS DIRECTED UNDER THE SKIN BEFORE MEALS UP TO 60 UNITS DAILY    isosorbide mononitrate ER (IMDUR) 30 mg, oral, Daily, Do not crush or chew.    latanoprost (Xalatan) 0.005 % ophthalmic solution 1 drop, Both Eyes, Daily    losartan (COZAAR) 100 mg, oral, Daily    meloxicam (MOBIC) 15 mg, oral, 3 times weekly    metFORMIN (GLUCOPHAGE) 500 mg, oral, 2 times daily with meals    metoprolol succinate XL (TOPROL-XL) 25 mg, oral, Daily    nystatin (Mycostatin) 100,000 unit/mL suspension 10 mL, oral, Daily    omeprazole (PRILOSEC) 40 mg, oral, Do not crush or chew.    Sure Comfort Pen Needle 32 gauge x 5/32\" needle     Tresiba FlexTouch U-100 100 unit/mL (3 mL) injection 30 UNITS DAILY DISP 30 ML 90 DAY SUPPLY REFILL 3       Physical Exam  AO x2, in mild distress  No JVD, supple neck  CTAB, no crackles/rales/wheezing  S1/S2 wnl, no mrg  Soft, nd, nt, +ve bs, no organomegaly  Warm and Dry extremities, unable to coperate for complete neuro exam, moving L extremities    Assessment/Plan   #large left-sided intraparenchymal hemorrhage   #Hospice placement  -hospice consult placed; further meeting with the family and Hospice in am for further dispo arrangements    Code Status:  DNR Comfort Measures Only  I " spent 45 minutes in the professional and overall care of this patient.  Addison Awad MD

## 2024-02-17 NOTE — CARE PLAN
Problem: Diabetes  Goal: Achieve decreasing blood glucose levels by end of shift  2/17/2024 0342 by Kristian Mayorga RN  Outcome: Progressing  2/17/2024 0341 by Kristian Mayorga RN  Outcome: Progressing  Goal: Increase stability of blood glucose readings by end of shift  2/17/2024 0342 by Kristian Mayorga RN  Outcome: Progressing  2/17/2024 0341 by Kristian Mayorga RN  Outcome: Progressing  Goal: Decrease in ketones present in urine by end of shift  2/17/2024 0342 by Kristian Mayorga RN  Outcome: Progressing  2/17/2024 0341 by Kristian Mayorga RN  Outcome: Progressing  Goal: Maintain electrolyte levels within acceptable range throughout shift  2/17/2024 0342 by Kristian Mayorga RN  Outcome: Progressing  2/17/2024 0341 by Kristain Mayorga RN  Outcome: Progressing  Goal: Maintain glucose levels >70mg/dl to <250mg/dl throughout shift  2/17/2024 0342 by Kristian Mayorga RN  Outcome: Progressing  2/17/2024 0341 by Kristian Mayorga RN  Outcome: Progressing  Goal: No changes in neurological exam by end of shift  2/17/2024 0342 by Kristian Mayorga RN  Outcome: Progressing  2/17/2024 0341 by Kristian Mayorga RN  Outcome: Progressing  Goal: Learn about and adhere to nutrition recommendations by end of shift  2/17/2024 0342 by Kristian Mayorga RN  Outcome: Progressing  2/17/2024 0341 by Kristian Mayorga RN  Outcome: Progressing  Goal: Vital signs within normal range for age by end of shift  2/17/2024 0342 by Kristian Mayorga RN  Outcome: Progressing  2/17/2024 0341 by Kristian Mayogra RN  Outcome: Progressing  Goal: Increase self care and/or family involovement by end of shift  2/17/2024 0342 by Kristian Mayorga RN  Outcome: Progressing  2/17/2024 0341 by Kristian Mayorga RN  Outcome: Progressing  Goal: Receive DSME education by end of shift  2/17/2024 0342 by Kristian Mayorga RN  Outcome: Progressing  2/17/2024 0341 by Kristian Mukesh, RN  Outcome: Progressing     Problem: Pain  Goal: My pain/discomfort is manageable  Outcome: Progressing     Problem: Safety  Goal: Patient will be  injury free during hospitalization  Outcome: Progressing  Goal: I will remain free of falls  Outcome: Progressing     Problem: Daily Care  Goal: Daily care needs are met  Outcome: Progressing     Problem: Discharge Barriers  Goal: My discharge needs are met  Outcome: Progressing

## 2024-02-18 NOTE — NURSING NOTE
Patient restless, pulling on gown, moaning. Medicated with MS as ordered.  Ambulance crew here, report given. Patient placed on cart without incident.   LILY Lim RN

## 2024-02-19 NOTE — ED PROCEDURE NOTE
Procedure  Critical Care    Performed by: Phill Bauer MD  Authorized by: Phill Bauer MD    Critical care provider statement:     Critical care time (minutes):  25    Critical care time was exclusive of:  Separately billable procedures and treating other patients and teaching time    Critical care was necessary to treat or prevent imminent or life-threatening deterioration of the following conditions:  CNS failure or compromise    Critical care was time spent personally by me on the following activities:  Discussions with consultants, evaluation of patient's response to treatment, obtaining history from patient or surrogate, ordering and review of laboratory studies, ordering and review of radiographic studies, review of old charts, re-evaluation of patient's condition and ordering and performing treatments and interventions               Phill Bauer MD  02/18/24 2500

## 2024-03-04 ENCOUNTER — APPOINTMENT (OUTPATIENT)
Dept: CARDIOLOGY | Facility: CLINIC | Age: 79
End: 2024-03-04
Payer: MEDICARE

## 2024-04-24 DIAGNOSIS — E11.65 TYPE 2 DIABETES MELLITUS WITH HYPERGLYCEMIA, WITH LONG-TERM CURRENT USE OF INSULIN (MULTI): Primary | ICD-10-CM

## 2024-04-24 DIAGNOSIS — Z79.4 TYPE 2 DIABETES MELLITUS WITH HYPERGLYCEMIA, WITH LONG-TERM CURRENT USE OF INSULIN (MULTI): Primary | ICD-10-CM

## 2024-04-24 NOTE — PROGRESS NOTES
Referral to Clinical Pharmacy placed - pt will be scheduled with pharmacist to discuss and apply for  Patient Assistance for her high cost medication(s).

## 2024-05-31 ENCOUNTER — APPOINTMENT (OUTPATIENT)
Dept: ENDOCRINOLOGY | Facility: CLINIC | Age: 79
End: 2024-05-31

## 2024-06-21 ENCOUNTER — APPOINTMENT (OUTPATIENT)
Dept: ENDOCRINOLOGY | Facility: CLINIC | Age: 79
End: 2024-06-21

## 2024-07-18 ENCOUNTER — APPOINTMENT (OUTPATIENT)
Dept: OPHTHALMOLOGY | Facility: CLINIC | Age: 79
End: 2024-07-18